# Patient Record
Sex: FEMALE | Race: BLACK OR AFRICAN AMERICAN | Employment: UNEMPLOYED | URBAN - METROPOLITAN AREA
[De-identification: names, ages, dates, MRNs, and addresses within clinical notes are randomized per-mention and may not be internally consistent; named-entity substitution may affect disease eponyms.]

---

## 2021-10-11 NOTE — PROGRESS NOTES
Chief complaint   Chief Complaint   Patient presents with    Back Pain       History of Present Illness:  Waldemar Krabbe is a  57 y.o.  female he comes in today for low back pain that radiates to her bilateral buttocks down to her feet. She also gets left groin pain. She describes it as sharp and stabbing especially with walking. She was a patient at [facility] for many years and has had epidural injections there every 6 months which did help. She states she has a known L4-L5 disc protrusion they were treating her for. She has been through physical therapy and does a home exercise program regularly. She states over the last month she has had increased pain in her back and can only stand 3 to 4 minutes at a time. She also has some right knee pain that simba on her at times. She states she was told she has had osteoarthritis in that knee before. She is unable to take any NSAIDs due to history of peptic ulcer disease. She has also recently had breast cancer and has had cervical cancer in 2013. She was a former . She is a non-smoker. She denies fever bowel bladder dysfunction. Her  is present with her in the office today. Past Medical History: Breast cancer August 2020 with chemotherapy and radiation, cervical cancer 2013, coronary disease, type 2 diabetes, hypertension, hyperlipidemia, MS, GERD, peptic ulcer disease    Past Surgical History: Left partial mastectomy, total hysterectomy      Physical Exam: Patient is a 70-year-old female who presents in a wheelchair today. She has 4 out of 5 strength bilateral lower extremities. Negative straight leg raise. She was able to get from sitting to standing on her own. She walks very antalgic Jagjit and slow with short steps. She has pain with hyperextension lumbar spine. Assessment and Plan: Is a patient who has back pain with bilateral radicular pain with a history of breast cancer and cervical cancer.   I did a lumbar AP and lateral x-ray. I will get a MRI of her lumbar spine. I will refer her to orthopedics for her right knee pain. She will follow-up with one of her physicians following the MRI. XRAY: 10/12/21   body part: Lumbar  side (rt/lt): bilateral  number of views taken:2  Findings: no acute finding    The x-ray will be officially read by Dr. Jules Larsen and scanned into the chart. Medications:        Review of systems:    Past Medical History:   Diagnosis Date    Acid reflux     Anemia     Arthritis     Asthma     Breast cancer ([facility] [address] current    left     Diabetes (HCC)     type 2    High cholesterol     Hypertension     Lumbar herniated disc     L4/L5, S1/S2    MI (mitral incompetence) 2002    MS (multiple sclerosis) (HCC)     Unexplained weight gain      Past Surgical History:   Procedure Laterality Date    HX HEART CATHETERIZATION  12/2020    NE MASTECTOMY, PARTIAL Left 12/07/2020     Social History     Socioeconomic History    Marital status: UNKNOWN     Spouse name: Not on file    Number of children: Not on file    Years of education: Not on file    Highest education level: Not on file   Occupational History    Not on file   Tobacco Use    Smoking status: Never Smoker    Smokeless tobacco: Never Used   Substance and Sexual Activity    Alcohol use: Not on file    Drug use: Not on file    Sexual activity: Not on file   Other Topics Concern    Not on file   Social History Narrative    Not on file     Social Determinants of Health     Financial Resource Strain:     Difficulty of Paying Living Expenses:    Food Insecurity:     Worried About Running Out of Food in the Last Year:     Amie of Food in the Last Year:    Transportation Needs:     Lack of Transportation (Medical):      Lack of Transportation (Non-Medical):    Physical Activity:     Days of Exercise per Week:     Minutes of Exercise per Session:    Stress:     Feeling of Stress :    Social Connections:     Frequency of Communication with Friends and Family:     Frequency of Social Gatherings with Friends and Family:     Attends Worship Services:     Active Member of Clubs or Organizations:     Attends Club or Organization Meetings:     Marital Status:    Intimate Partner Violence:     Fear of Current or Ex-Partner:     Emotionally Abused:     Physically Abused:     Sexually Abused:      No family history on file. Physical Exam:  Visit Vitals  Pulse 90   Temp 97.2 °F (36.2 °C) (Skin)   Ht 4' 11.75\" (1.518 m)   Wt 228 lb (103.4 kg)   SpO2 98% Comment: RA   BMI 44.90 kg/m²     Pain Scale: 7/10    LPMP has been . reviewed and is appropriate        Diagnoses and all orders for this visit:    1. Chronic bilateral low back pain with bilateral sciatica  -     AMB POC XRAY, SPINE, LUMBOSACRAL; 2 O  -     MRI LUMB SPINE WO CONT; Future    2. History of cancer  -     MRI LUMB SPINE WO CONT; Future    3. Chronic pain of right knee  -     REFERRAL TO ORTHOPEDICS            Follow-up and Dispositions    · Return in about 4 weeks (around 11/9/2021) for with Dr. Nader Tapia or Dr Madeline Littlejohn.              We have informed Derick Howell to notify us for immediate appointment if she has any worsening neurogical symptoms or if an emergency situation presents, then call [phone]

## 2021-10-12 ENCOUNTER — OFFICE VISIT (OUTPATIENT)
Dept: ORTHOPEDIC SURGERY | Age: 61
End: 2021-10-12
Payer: MEDICARE

## 2021-10-12 VITALS
OXYGEN SATURATION: 98 % | TEMPERATURE: 97.2 F | BODY MASS INDEX: 44.76 KG/M2 | WEIGHT: 228 LBS | HEART RATE: 90 BPM | HEIGHT: 60 IN

## 2021-10-12 DIAGNOSIS — M54.41 CHRONIC BILATERAL LOW BACK PAIN WITH BILATERAL SCIATICA: Primary | ICD-10-CM

## 2021-10-12 DIAGNOSIS — G89.29 CHRONIC PAIN OF RIGHT KNEE: ICD-10-CM

## 2021-10-12 DIAGNOSIS — G89.29 CHRONIC BILATERAL LOW BACK PAIN WITH BILATERAL SCIATICA: Primary | ICD-10-CM

## 2021-10-12 DIAGNOSIS — M25.561 CHRONIC PAIN OF RIGHT KNEE: ICD-10-CM

## 2021-10-12 DIAGNOSIS — M54.42 CHRONIC BILATERAL LOW BACK PAIN WITH BILATERAL SCIATICA: Primary | ICD-10-CM

## 2021-10-12 DIAGNOSIS — Z85.9 HISTORY OF CANCER: ICD-10-CM

## 2021-10-12 DIAGNOSIS — G89.29 CHRONIC BILATERAL LOW BACK PAIN WITH BILATERAL SCIATICA: ICD-10-CM

## 2021-10-12 DIAGNOSIS — M54.41 CHRONIC BILATERAL LOW BACK PAIN WITH BILATERAL SCIATICA: ICD-10-CM

## 2021-10-12 DIAGNOSIS — M54.42 CHRONIC BILATERAL LOW BACK PAIN WITH BILATERAL SCIATICA: ICD-10-CM

## 2021-10-12 PROCEDURE — G8427 DOCREV CUR MEDS BY ELIG CLIN: HCPCS | Performed by: NURSE PRACTITIONER

## 2021-10-12 PROCEDURE — 99203 OFFICE O/P NEW LOW 30 MIN: CPT | Performed by: NURSE PRACTITIONER

## 2021-10-12 PROCEDURE — 3017F COLORECTAL CA SCREEN DOC REV: CPT | Performed by: NURSE PRACTITIONER

## 2021-10-12 PROCEDURE — G8432 DEP SCR NOT DOC, RNG: HCPCS | Performed by: NURSE PRACTITIONER

## 2021-10-12 PROCEDURE — G8417 CALC BMI ABV UP PARAM F/U: HCPCS | Performed by: NURSE PRACTITIONER

## 2021-10-12 PROCEDURE — 72100 X-RAY EXAM L-S SPINE 2/3 VWS: CPT | Performed by: NURSE PRACTITIONER

## 2021-10-12 RX ORDER — DICLOFENAC SODIUM 10 MG/G
2 GEL TOPICAL 4 TIMES DAILY
COMMUNITY

## 2021-10-12 RX ORDER — ACETAMINOPHEN 325 MG/1
650 TABLET ORAL
COMMUNITY

## 2021-10-12 RX ORDER — LANOLIN ALCOHOL/MO/W.PET/CERES
3 CREAM (GRAM) TOPICAL
COMMUNITY

## 2021-10-12 RX ORDER — LACTULOSE 10 G/15ML
1 SOLUTION ORAL; RECTAL
COMMUNITY
End: 2022-02-01

## 2021-10-12 RX ORDER — BENAZEPRIL HYDROCHLORIDE 40 MG/1
40 TABLET ORAL DAILY
COMMUNITY
End: 2021-11-12 | Stop reason: SDUPTHER

## 2021-10-12 RX ORDER — CHLORTHALIDONE 25 MG/1
25 TABLET ORAL DAILY
COMMUNITY
End: 2021-11-12 | Stop reason: SDUPTHER

## 2021-10-12 RX ORDER — ONDANSETRON HYDROCHLORIDE 8 MG/1
8 TABLET, FILM COATED ORAL
COMMUNITY

## 2021-10-12 RX ORDER — MELATONIN
2000 DAILY
COMMUNITY

## 2021-10-12 RX ORDER — RANITIDINE 300 MG/1
300 TABLET ORAL DAILY
COMMUNITY
End: 2022-02-01

## 2021-10-12 RX ORDER — ALBUTEROL SULFATE 90 UG/1
1 AEROSOL, METERED RESPIRATORY (INHALATION)
COMMUNITY

## 2021-10-12 RX ORDER — DEXAMETHASONE 4 MG/1
4 TABLET ORAL EVERY 12 HOURS
COMMUNITY

## 2021-10-12 RX ORDER — POTASSIUM CHLORIDE 750 MG/1
10 TABLET, FILM COATED, EXTENDED RELEASE ORAL DAILY
COMMUNITY
End: 2022-05-26 | Stop reason: SDUPTHER

## 2021-10-12 RX ORDER — OMEPRAZOLE 20 MG/1
20 TABLET, DELAYED RELEASE ORAL DAILY
COMMUNITY

## 2021-10-12 RX ORDER — ANASTROZOLE 1 MG/1
1 TABLET ORAL DAILY
COMMUNITY
Start: 2021-10-04

## 2021-10-12 RX ORDER — ASPIRIN 81 MG/1
81 TABLET ORAL DAILY
COMMUNITY
End: 2021-11-12 | Stop reason: SDUPTHER

## 2021-10-12 RX ORDER — METOPROLOL SUCCINATE 200 MG/1
200 TABLET, EXTENDED RELEASE ORAL DAILY
COMMUNITY
End: 2021-11-12 | Stop reason: SDUPTHER

## 2021-10-12 RX ORDER — METFORMIN HYDROCHLORIDE 500 MG/1
500 TABLET ORAL 2 TIMES DAILY WITH MEALS
COMMUNITY

## 2021-10-12 RX ORDER — POLYETHYLENE GLYCOL 3350 17 G/17G
17 POWDER, FOR SOLUTION ORAL
COMMUNITY
End: 2022-02-01

## 2021-10-12 RX ORDER — TROSPIUM CHLORIDE 20 MG/1
20 TABLET, FILM COATED ORAL
COMMUNITY

## 2021-10-12 RX ORDER — MINERAL OIL/HYDROPHIL PETROLAT
OINTMENT (GRAM) TOPICAL AS NEEDED
COMMUNITY

## 2021-10-12 RX ORDER — AMLODIPINE BESYLATE 10 MG/1
10 TABLET ORAL DAILY
COMMUNITY
End: 2021-11-12 | Stop reason: SDUPTHER

## 2021-10-12 RX ORDER — SENNOSIDES 8.6 MG/1
1 TABLET ORAL
COMMUNITY
End: 2022-02-01

## 2021-10-12 RX ORDER — MOMETASONE FUROATE 220 UG/1
1 INHALANT RESPIRATORY (INHALATION) DAILY
COMMUNITY

## 2021-10-12 RX ORDER — ATORVASTATIN CALCIUM 40 MG
40 TABLET ORAL DAILY
COMMUNITY
End: 2021-11-12 | Stop reason: SDUPTHER

## 2021-10-12 RX ORDER — SPIRONOLACTONE 50 MG/1
50 TABLET, FILM COATED ORAL DAILY
COMMUNITY
End: 2021-11-12 | Stop reason: SDUPTHER

## 2021-10-12 RX ORDER — PREGABALIN 25 MG/1
100 CAPSULE ORAL 2 TIMES DAILY
COMMUNITY
End: 2022-02-01

## 2021-10-12 RX ORDER — INTERFERON BETA-1A 30 UG/.5ML
30 INJECTION, SOLUTION INTRAMUSCULAR
COMMUNITY

## 2021-10-12 RX ORDER — FOLIC ACID 1 MG/1
1 TABLET ORAL DAILY
COMMUNITY

## 2021-10-12 RX ORDER — PROCHLORPERAZINE MALEATE 5 MG
5 TABLET ORAL
COMMUNITY

## 2021-10-12 RX ORDER — MELOXICAM 7.5 MG/1
7.5 TABLET ORAL
COMMUNITY

## 2021-10-12 NOTE — PROGRESS NOTES
Juanita Marina presents today for   Chief Complaint   Patient presents with    Back Pain       Is someone accompanying this pt? Yes, male    Is the patient using any DME equipment during OV? Yes, wheelchair      Coordination of Care:  1. Have you been to the ER, urgent care clinic since your last visit? no  Hospitalized since your last visit? no    2. Have you seen or consulted any other health care providers outside of the [address] [place] [address] since your last visit? Yes, oncology/radiology Include any pap smears or colon screening.  no

## 2021-11-09 ENCOUNTER — OFFICE VISIT (OUTPATIENT)
Dept: ORTHOPEDIC SURGERY | Age: 61
End: 2021-11-09
Payer: MEDICARE

## 2021-11-09 VITALS — OXYGEN SATURATION: 98 % | HEIGHT: 60 IN | HEART RATE: 76 BPM | BODY MASS INDEX: 44.9 KG/M2 | TEMPERATURE: 97.3 F

## 2021-11-09 DIAGNOSIS — M25.561 CHRONIC PAIN OF RIGHT KNEE: ICD-10-CM

## 2021-11-09 DIAGNOSIS — M54.16 RIGHT LUMBAR RADICULOPATHY: Primary | ICD-10-CM

## 2021-11-09 DIAGNOSIS — G89.29 CHRONIC PAIN OF BOTH HIPS: ICD-10-CM

## 2021-11-09 DIAGNOSIS — G89.29 CHRONIC PAIN OF RIGHT KNEE: ICD-10-CM

## 2021-11-09 DIAGNOSIS — M25.552 CHRONIC PAIN OF BOTH HIPS: ICD-10-CM

## 2021-11-09 DIAGNOSIS — M25.551 CHRONIC PAIN OF BOTH HIPS: ICD-10-CM

## 2021-11-09 DIAGNOSIS — M54.16 RIGHT LUMBAR RADICULOPATHY: ICD-10-CM

## 2021-11-09 PROCEDURE — G8417 CALC BMI ABV UP PARAM F/U: HCPCS | Performed by: PHYSICAL MEDICINE & REHABILITATION

## 2021-11-09 PROCEDURE — G8432 DEP SCR NOT DOC, RNG: HCPCS | Performed by: PHYSICAL MEDICINE & REHABILITATION

## 2021-11-09 PROCEDURE — G8427 DOCREV CUR MEDS BY ELIG CLIN: HCPCS | Performed by: PHYSICAL MEDICINE & REHABILITATION

## 2021-11-09 PROCEDURE — 3017F COLORECTAL CA SCREEN DOC REV: CPT | Performed by: PHYSICAL MEDICINE & REHABILITATION

## 2021-11-09 PROCEDURE — 99214 OFFICE O/P EST MOD 30 MIN: CPT | Performed by: PHYSICAL MEDICINE & REHABILITATION

## 2021-11-09 NOTE — LETTER
11/9/2021    Patient: Trina Pate   YOB: 1960   Date of Visit: 11/9/2021     [facility],[place] [address], [address] [place] [address] Drive [address] [place] [phone]  Via Fax: [phone]    Dear [facility] [address], DO,      Thank you for referring Ms. Trina Pate to [facility] for evaluation. My notes for this consultation are attached. If you have questions, please do not hesitate to call me. I look forward to following your patient along with you.       Sincerely,    Daquan Schneider MD

## 2021-11-09 NOTE — PROGRESS NOTES
Ann Marie Doe presents today for   Chief Complaint   Patient presents with    Back Pain       Is someone accompanying this pt? Yes, male    Is the patient using any DME equipment during OV? Yes, wheelchair      Coordination of Care:  1. Have you been to the ER, urgent care clinic since your last visit? no  Hospitalized since your last visit? no    2. Have you seen or consulted any other health care providers outside of the [address] [place] [address] since your last visit? Yes, [facility]. Include any pap smears or colon screening.  no

## 2021-11-09 NOTE — PROGRESS NOTES
[address]  [address], [address]  [place], [address] [place] [address] Street  Phone: [phone]  Fax: [phone]      Patient: Brian Sams                                                                              MRN: [id number]        YOB: 1960          AGE: 57 y.o. PCP: Silas Fairbanks DO  Date:  11/09/21    Reason for Consultation: Back Pain      HPI:  Brian Sams is a 57 y.o. 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 She has previously lived in [place] and was followed by Dr. Karen Prieto at [address]. At the time she had mainly low back pain radiating down her left leg. MRI lumbar spine in 2017 demonstrates L4-5 and L5-S1 disc bulge, facet arthropathy, b/l mild foraminal stenosis at L5-S1. She tried PT and an L5-S1 IL UMESH (12/5/2018). She is taking lyrica 100mg, 100mg 200mg.   7/2020 she had covid and while undergoing evaluation she as found to have left breast cancer. Planning for PET scan 12/6/2021. Also reports chronic right knee pain related to arthritis, and b/l hip pain       Neurologic symptoms: No numbness, tingling, weakness, bowel or bladder changes. No recent falls in past 6 months, since her breast CA and radiation she has limited walking tolerance and is using a wheelchair. Location: The pain is located in the low back (left) , also left groin pain   Radiation: The pain does radiate down the left leg towards the ankle,   Pain Score: Currently: 10/10    Quality: Pain is of a Stabbing and Pulling quality. Aggravating: Pain is exacerbated by sitting, standing and lying down  Alleviating:  The pain is alleviated by nothing    Prior Treatments:   Dr. Lidia Burr -ESIs- last early 2020  Physical therapy- completed 8/2021  Previous Medications:   Current Medications:lyrica 100mg, 100mg, 200mg, meloxicam, on dexamethasone 4mg q12hr  Previous work-up has included:   Per records   MRI lumbar spine in 2017 demonstrates L4-5 and L5-S1 disc bulge, facet arthropathy, b/l mild foraminal stenosis at L5-S1. Past Medical History:   Past Medical History:   Diagnosis Date    Acid reflux     Anemia     Arthritis     Asthma     Breast cancer ([address] current    left     Diabetes (HCC)     type 2    High cholesterol     Hypertension     Lumbar herniated disc     L4/L5, S1/S2    MI (mitral incompetence) 2002    MS (multiple sclerosis) (HCC)     Unexplained weight gain       Past Surgical History:   Past Surgical History:   Procedure Laterality Date    HX HEART CATHETERIZATION  12/2020    RI MASTECTOMY, PARTIAL Left 12/07/2020      SocHx:   Social History     Tobacco Use    Smoking status: Never Smoker    Smokeless tobacco: Never Used   Substance Use Topics    Alcohol use: Not on file      FamHx:? No family history on file. Current Medications:    Current Outpatient Medications   Medication Sig Dispense Refill    anastrozole (ARIMIDEX) 1 mg tablet Take 1 mg by mouth daily.  albuterol (PROVENTIL HFA, VENTOLIN HFA, PROAIR HFA) 90 mcg/actuation inhaler Take 1 Puff by inhalation every six (6) hours as needed for Wheezing.  acetaminophen (TYLENOL) 325 mg tablet Take 650 mg by mouth every six (6) hours as needed for Pain.  amLODIPine (Norvasc) 10 mg tablet Take 10 mg by mouth daily.  mineral oil-hydrophil petrolat (Aquaphor) ointment Apply  to affected area as needed for Dry Skin.  aspirin delayed-release 81 mg tablet Take 81 mg by mouth daily.  atorvastatin (Lipitor) 40 mg tablet Take 40 mg by mouth daily.  beclomethasone (Qvar) 40 mcg/actuation aero Take 1 Puff by inhalation two (2) times a day.  benazepriL (LOTENSIN) 40 mg tablet Take 40 mg by mouth daily.       chlorthalidone (HYGROTON) 25 mg tablet Take 25 mg by mouth daily.      cholecalciferol (VITAMIN D3) (1000 Units /25 mcg) tablet Take 1,000 Units by mouth daily.  dexAMETHasone (Decadron) 4 mg tablet Take 4 mg by mouth every twelve (12) hours.  diclofenac (Voltaren) 1 % gel Apply 2 g to affected area four (4) times daily.  folic acid (FOLVITE) 1 mg tablet Take 1 mg by mouth daily.  interferon beta-1a (AVONEX) 30 mcg/0.5 mL injection 30 mcg by IntraMUSCular route once.  lactulose (CHRONULAC) 10 gram/15 mL solution Take 1 tsp by mouth daily as needed.  melatonin 3 mg tablet Take 3 mg by mouth nightly as needed for Insomnia.  meloxicam (MOBIC) 7.5 mg tablet Take 7.5 mg by mouth daily.  metFORMIN (GLUCOPHAGE) 500 mg tablet Take 500 mg by mouth two (2) times daily (with meals).  metoprolol succinate (TOPROL-XL) 200 mg XL tablet Take 200 mg by mouth daily.  mometasone (Asmanex Twisthaler) 220 mcg/ actuation (14) inhaler Take 1 Puff by inhalation daily.  omeprazole (PriLOSEC OTC) 20 mg tablet Take 20 mg by mouth daily.  ondansetron hcl (ZOFRAN) 8 mg tablet Take 8 mg by mouth every eight (8) hours as needed for Nausea or Vomiting.  polyethylene glycol (Miralax) 17 gram packet Take 17 g by mouth daily as needed for Constipation.  potassium chloride SR (Klor-Con 10) 10 mEq tablet Take 10 mEq by mouth daily.  pregabalin (Lyrica) 25 mg capsule Take 25 mg by mouth two (2) times a day.  prochlorperazine (Compazine) 5 mg tablet Take 5 mg by mouth every six (6) hours as needed for Nausea or Vomiting.  raNITIdine (ZANTAC) 300 mg tab Take 300 mg by mouth daily.  senna (Senna) 8.6 mg tablet Take 1 Tablet by mouth daily as needed for Constipation.  spironolactone (Aldactone) 50 mg tablet Take 50 mg by mouth daily.  trospium (SANCTURA) 20 mg tablet Take 20 mg by mouth Before breakfast, lunch, and dinner. Allergies:     Allergies   Allergen Reactions    Penicillin G Hives and Rash Review of Systems:   Gen:    Denied fevers, chills, malaise, fatigue, weight changes   Resp: Denied shortness of breath, cough, wheezing   CVS: Denied chest pain, palpitations   : Denied urinary urgency, frequency, incontinence   GI: Denied nausea, vomiting, constipation, diarrhea   Skin: Denied rashes, wounds   Psych: Denied anxiety, depression   Vasc: Denied claudication, ulcers   Hem: Denied easy bruising/bleeding   MSK: See HPI   Neuro: See HPI         Physical Exam     Vital Signs:   Visit Vitals  Pulse 76   Temp 97.3 °F (36.3 °C) (Skin)   Ht 4' 11.75\" (1.518 m)   SpO2 98% Comment: RA   BMI 44.90 kg/m²      General: ??????? Well nourished and well developed female without any acute distress   Psychiatric: ?  Alert and oriented x 3 with normal mood    HEENT: ???????? Atraumatic   Respiratory:   Breathing non-labored and non dyspneic   CV: ???????????????? Peripheral pulses intact, no peripheral edema   Skin: ????????????? No rashes       Neurologic: ?? Sensation: normal and grossly intact thebilateral, lower extremity(s)   Strength: 5/5 in the bilateral, lower extremity(s) b/l HF 4/5, rightKE limited by pain  Reflexes: reveals 2+ symmetric DTRs throughout patella  Gait: unsteady, antalgic decreased stance time right leg       Pain with lumbar flexion and extension  Tender throughout lumbar paraspinals  Tender L>R gluteal and SI joint    Tender right medial and lateral joint line  Pain with FADIR b/l  Negative log roll  + stinchfield b/l   Pain with HUI     Medical Decision Making:    Images: The imaging results as well as the actual images of the studies below were reviewed and visualized. Labs: The results below were reviewed. Records reviewed from [address] Dr. Barney Goodrich     Assessment:   -low back pain  -right knee oa  -b/l hip pain    Plan:      -Physical therapy -  Consider referral to PT after imaging  -Medications continue current medications-Lyrica,no refill required at this time.  Counseled regarding side effects and appropriate administration of medications.    -Diagnostics/Imaging -MRI lumbar spine, x-ray right knee and b/l hips  -Injections - consider UMESH- had relief with L4-5 [place] UMESH in 2018   -Education - The patient's diagnosis, prognosis and treatment options were discussed today.  All questions were answered.    -Coordination of Care- Reviewed prior records from [address]  F/U - in after MRI          [facility]

## 2021-11-12 ENCOUNTER — OFFICE VISIT (OUTPATIENT)
Dept: CARDIOLOGY CLINIC | Age: 61
End: 2021-11-12
Payer: MEDICARE

## 2021-11-12 VITALS
DIASTOLIC BLOOD PRESSURE: 74 MMHG | SYSTOLIC BLOOD PRESSURE: 110 MMHG | WEIGHT: 235 LBS | OXYGEN SATURATION: 97 % | HEIGHT: 60 IN | HEART RATE: 74 BPM | BODY MASS INDEX: 46.13 KG/M2

## 2021-11-12 DIAGNOSIS — I25.2 HISTORY OF MI (MYOCARDIAL INFARCTION): Primary | ICD-10-CM

## 2021-11-12 PROCEDURE — G8417 CALC BMI ABV UP PARAM F/U: HCPCS | Performed by: INTERNAL MEDICINE

## 2021-11-12 PROCEDURE — G8427 DOCREV CUR MEDS BY ELIG CLIN: HCPCS | Performed by: INTERNAL MEDICINE

## 2021-11-12 PROCEDURE — 93000 ELECTROCARDIOGRAM COMPLETE: CPT | Performed by: INTERNAL MEDICINE

## 2021-11-12 PROCEDURE — 3017F COLORECTAL CA SCREEN DOC REV: CPT | Performed by: INTERNAL MEDICINE

## 2021-11-12 PROCEDURE — G8510 SCR DEP NEG, NO PLAN REQD: HCPCS | Performed by: INTERNAL MEDICINE

## 2021-11-12 PROCEDURE — 99204 OFFICE O/P NEW MOD 45 MIN: CPT | Performed by: INTERNAL MEDICINE

## 2021-11-12 RX ORDER — SPIRONOLACTONE 50 MG/1
50 TABLET, FILM COATED ORAL DAILY
Qty: 90 TABLET | Refills: 3 | Status: SHIPPED | OUTPATIENT
Start: 2021-11-12 | End: 2022-05-26 | Stop reason: SDUPTHER

## 2021-11-12 RX ORDER — BENAZEPRIL HYDROCHLORIDE 40 MG/1
40 TABLET ORAL DAILY
Qty: 90 TABLET | Refills: 3 | Status: SHIPPED | OUTPATIENT
Start: 2021-11-12 | End: 2021-11-23 | Stop reason: SDUPTHER

## 2021-11-12 RX ORDER — ASPIRIN 81 MG/1
81 TABLET ORAL DAILY
Qty: 90 TABLET | Refills: 3 | Status: SHIPPED | OUTPATIENT
Start: 2021-11-12 | End: 2022-05-26 | Stop reason: SDUPTHER

## 2021-11-12 RX ORDER — AMLODIPINE BESYLATE 10 MG/1
10 TABLET ORAL DAILY
Qty: 90 TABLET | Refills: 3 | Status: SHIPPED | OUTPATIENT
Start: 2021-11-12 | End: 2022-05-26 | Stop reason: SDUPTHER

## 2021-11-12 RX ORDER — METOPROLOL SUCCINATE 200 MG/1
200 TABLET, EXTENDED RELEASE ORAL DAILY
Qty: 90 TABLET | Refills: 3 | Status: SHIPPED | OUTPATIENT
Start: 2021-11-12 | End: 2022-05-26 | Stop reason: SDUPTHER

## 2021-11-12 RX ORDER — ATORVASTATIN CALCIUM 40 MG/1
40 TABLET, FILM COATED ORAL DAILY
Qty: 90 TABLET | Refills: 3 | Status: SHIPPED | OUTPATIENT
Start: 2021-11-12 | End: 2022-05-26 | Stop reason: SDUPTHER

## 2021-11-12 RX ORDER — CHLORTHALIDONE 25 MG/1
25 TABLET ORAL DAILY
Qty: 90 TABLET | Refills: 3 | Status: SHIPPED | OUTPATIENT
Start: 2021-11-12 | End: 2022-05-26 | Stop reason: SDUPTHER

## 2021-11-12 NOTE — PROGRESS NOTES
Pranay Roberts    Chief Complaint   Patient presents with    New Patient     h/o MI     Shortness of Breath     exertional     Leg Swelling     mild    Palpitations     sometimes       HPI    Pranay Roberts is a 57 y.o. extremely pleasant patient with history of CAD and myocardial infarctions x2 in the past approximately in 2000 and 2009 but no cast who presented for a left heart cath in the setting of ongoing chest and shoulder pain back in September of 2020 (Jeff Rodrigues). I personally obtained and reviewed records his cath had no obstructive coronary disease there was commented mild diffuse disease in the RCA up to 25% in the midportion. She was diagnosed in August 2020 of breast cancer but it does not sound like she underwent mastectomy until after the above cath mention. I do not have all the details at the time of my encounter but she says she has completed chemotherapy still following with her oncologist down there and was able to see she had a normal ejection fraction back in 2019. She has gained weight and she has followed with a cardiologist she says since the early 2000. It is unclear what happened in the early 2000's but she had been maintained on dual antiplatelet therapy for years so finally after her most recent cath last year Plavix was stopped and she was started on high intensity statin. She has no new complaints she can feel short of breath has some nonpitting edema in her legs also can feel palpitations but really no new chest discomfort it sounds like she has a lot of back and shoulder pain which sounds chronic what led to her last cath.     Past Medical History:   Diagnosis Date    Acid reflux     Anemia     Arthritis     Asthma     Breast cancer ([address] current    left     Diabetes ([address]     type 2    High cholesterol     Hypertension     Lumbar herniated disc     L4/L5, S1/S2    MI (mitral incompetence) 2002    MS (multiple sclerosis) (HCC)     Unexplained weight gain        Past Surgical History:   Procedure Laterality Date    HX HEART CATHETERIZATION  12/2020    [place] MASTECTOMY, PARTIAL Left 12/07/2020       Current Outpatient Medications   Medication Sig Dispense Refill    anastrozole (ARIMIDEX) 1 mg tablet Take 1 mg by mouth daily.  albuterol (PROVENTIL HFA, VENTOLIN HFA, PROAIR HFA) 90 mcg/actuation inhaler Take 1 Puff by inhalation every six (6) hours as needed for Wheezing.  acetaminophen (TYLENOL) 325 mg tablet Take 650 mg by mouth every six (6) hours as needed for Pain.  amLODIPine (Norvasc) 10 mg tablet Take 10 mg by mouth daily.  mineral oil-hydrophil petrolat (Aquaphor) ointment Apply  to affected area as needed for Dry Skin.  aspirin delayed-release 81 mg tablet Take 81 mg by mouth daily.  atorvastatin (Lipitor) 40 mg tablet Take 40 mg by mouth daily.  beclomethasone (Qvar) 40 mcg/actuation aero Take 1 Puff by inhalation two (2) times a day.  benazepriL (LOTENSIN) 40 mg tablet Take 40 mg by mouth daily.  chlorthalidone (HYGROTON) 25 mg tablet Take 25 mg by mouth daily.  cholecalciferol (VITAMIN D3) (1000 Units /25 mcg) tablet Take 1,000 Units by mouth daily.  dexAMETHasone (Decadron) 4 mg tablet Take 4 mg by mouth every twelve (12) hours.  diclofenac (Voltaren) 1 % gel Apply 2 g to affected area four (4) times daily.  folic acid (FOLVITE) 1 mg tablet Take 1 mg by mouth daily.  interferon beta-1a (AVONEX) 30 mcg/0.5 mL injection 30 mcg by IntraMUSCular route once.  lactulose (CHRONULAC) 10 gram/15 mL solution Take 1 tsp by mouth daily as needed.  melatonin 3 mg tablet Take 3 mg by mouth nightly as needed for Insomnia.  meloxicam (MOBIC) 7.5 mg tablet Take 7.5 mg by mouth daily.  metFORMIN (GLUCOPHAGE) 500 mg tablet Take 500 mg by mouth two (2) times daily (with meals).  metoprolol succinate (TOPROL-XL) 200 mg XL tablet Take 200 mg by mouth daily.       mometasone (Asmanex Twisthaler) 220 mcg/ actuation (14) inhaler Take 1 Puff by inhalation daily.  omeprazole (PriLOSEC OTC) 20 mg tablet Take 20 mg by mouth daily.  ondansetron hcl (ZOFRAN) 8 mg tablet Take 8 mg by mouth every eight (8) hours as needed for Nausea or Vomiting.  polyethylene glycol (Miralax) 17 gram packet Take 17 g by mouth daily as needed for Constipation.  potassium chloride SR (Klor-Con 10) 10 mEq tablet Take 10 mEq by mouth daily.  pregabalin (Lyrica) 25 mg capsule Take 25 mg by mouth two (2) times a day.  prochlorperazine (Compazine) 5 mg tablet Take 5 mg by mouth every six (6) hours as needed for Nausea or Vomiting.  raNITIdine (ZANTAC) 300 mg tab Take 300 mg by mouth daily.  senna (Senna) 8.6 mg tablet Take 1 Tablet by mouth daily as needed for Constipation.  spironolactone (Aldactone) 50 mg tablet Take 50 mg by mouth daily.  trospium (SANCTURA) 20 mg tablet Take 20 mg by mouth Before breakfast, lunch, and dinner. Allergies   Allergen Reactions    Penicillin G Hives and Rash       Social History     Socioeconomic History    Marital status: UNKNOWN     Spouse name: Not on file    Number of children: Not on file    Years of education: Not on file    Highest education level: Not on file   Occupational History    Not on file   Tobacco Use    Smoking status: Never Smoker    Smokeless tobacco: Never Used   Substance and Sexual Activity    Alcohol use: Not on file    Drug use: Not on file    Sexual activity: Not on file   Other Topics Concern    Not on file   Social History Narrative    Not on file     Social Determinants of Health     Financial Resource Strain:     Difficulty of Paying Living Expenses: Not on file   Food Insecurity:     Worried About Running Out of Food in the Last Year: Not on file    Amie of Food in the Last Year: Not on file   Transportation Needs:     Lack of Transportation (Medical):  Not on file    Lack of Transportation (Non-Medical): Not on file   Physical Activity:     Days of Exercise per Week: Not on file    Minutes of Exercise per Session: Not on file   Stress:     Feeling of Stress : Not on file   Social Connections:     Frequency of Communication with Friends and Family: Not on file    Frequency of Social Gatherings with Friends and Family: Not on file    Attends Church Services: Not on file    Active Member of [address] [place] [id number] or Organizations: Not on file    Attends Club or Organization Meetings: Not on file    Marital Status: Not on file   Intimate Partner Violence:     Fear of Current or Ex-Partner: Not on file    Emotionally Abused: Not on file    Physically Abused: Not on file    Sexually Abused: Not on file   Housing Stability:     Unable to Pay for Housing in the Last Year: Not on file    Number of [place] in the Last Year: Not on file    Unstable Housing in the Last Year: Not on file    just moved to [place] where  is from    [facility]: Details are unknown at the time of the encounter but patient says her mother  of a heart attack at age [de-identified], she is got 5 brothers and 2 of them have \"heart issues\" and another brother who  of CVA at 48    Review of Systems    15 pt Review of Systems is negative unless otherwise mentioned in the HPI. Wt Readings from Last 3 Encounters:   21 106.6 kg (235 lb)   10/12/21 103.4 kg (228 lb)     Temp Readings from Last 3 Encounters:   21 97.3 °F (36.3 °C) (Skin)   10/12/21 97.2 °F (36.2 °C) (Skin)     BP Readings from Last 3 Encounters:   21 110/74     Pulse Readings from Last 3 Encounters:   21 74   21 76   10/12/21 90       Physical Exam:    Visit Vitals  /74 (BP 1 Location: Left upper arm, BP Patient Position: Sitting, BP Cuff Size: Adult)   Pulse 74   Ht 4' 11.75\" (1.518 m)   Wt 106.6 kg (235 lb)   SpO2 97%   BMI 46.28 kg/m²      Physical Exam  HENT:      Head: Normocephalic and atraumatic.    Eyes: Pupils: Pupils are equal, round, and reactive to light. Cardiovascular:      Rate and Rhythm: Normal rate and regular rhythm. Heart sounds: Normal heart sounds. No murmur heard. No friction rub. No gallop. Pulmonary:      Effort: Pulmonary effort is normal. No respiratory distress. Breath sounds: Normal breath sounds. No wheezing or rales. Chest:      Chest wall: No tenderness. Abdominal:      General: Bowel sounds are normal.      Palpations: Abdomen is soft. Musculoskeletal:         General: No tenderness. Skin:     General: Skin is warm and dry. Neurological:      Mental Status: She is alert and oriented to person, place, and time. EKG today shows: NSR, normal axis and intervals, no ST segment abnormalities    Impression and Plan:  Vivek Chavez is a 57 y.o. with:    Nonobstructive CAD, [facility] 9/2020  H/o \"myocardial infarction\" (nonathero MI x2 in early 2000s was on DAPT for years)  H/o CVA affecting her right side  MS  HTN  DM2  Breast CA s/p chemo/ radiation/ mastectomy    Obtaining records  Rx/ take over CV meds  RTC 6 months  Will consider repeat echo if nothing since 2019    Thank you for allowing me to participate in the care of your patient, please do not hesitate to call with questions or concerns.     [address] [facility],    Diogo Angelo, DO

## 2021-11-12 NOTE — PROGRESS NOTES
Funmilayo Peterson presents today for   Chief Complaint   Patient presents with    New Patient     h/o MI     Shortness of Breath     exertional     Leg Swelling     mild    Palpitations     sometimes       Paz Hernandez preferred language for health care discussion is english/other. Is someone accompanying this pt?      Is the patient using any DME equipment during [address]? Wheelchair     Depression Screening:  3 most recent PHQ Screens 11/12/2021   Little interest or pleasure in doing things Not at all   Feeling down, depressed, irritable, or hopeless Not at all   Total Score PHQ 2 0       Learning Assessment:  Learning Assessment 11/12/2021   PRIMARY LEARNER Patient   PRIMARY LANGUAGE ENGLISH   LEARNER PREFERENCE PRIMARY DEMONSTRATION   ANSWERED BY Patient   RELATIONSHIP SELF       Abuse Screening:  Abuse Screening Questionnaire 11/12/2021   Do you ever feel afraid of your partner? N   Are you in a relationship with someone who physically or mentally threatens you? N   Is it safe for you to go home? Y       Fall Risk  No flowsheet data found. Pt currently taking Anticoagulant therapy? ASA 81mg every day     Coordination of Care:  1. Have you been to the ER, urgent care clinic since your last visit? Hospitalized since your last visit? no    2. Have you seen or consulted any other health care providers outside of the [address] [place] [address] since your last visit? Include any pap smears or colon screening.  no

## 2021-11-23 RX ORDER — BENAZEPRIL HYDROCHLORIDE 40 MG/1
40 TABLET ORAL DAILY
Qty: 90 TABLET | Refills: 3 | Status: SHIPPED | OUTPATIENT
Start: 2021-11-23 | End: 2022-05-26 | Stop reason: SDUPTHER

## 2021-12-10 ENCOUNTER — OFFICE VISIT (OUTPATIENT)
Dept: ORTHOPEDIC SURGERY | Age: 61
End: 2021-12-10
Payer: MEDICARE

## 2021-12-10 VITALS — HEART RATE: 81 BPM | OXYGEN SATURATION: 97 % | TEMPERATURE: 98.7 F

## 2021-12-10 DIAGNOSIS — G89.29 CHRONIC PAIN OF RIGHT KNEE: Primary | ICD-10-CM

## 2021-12-10 DIAGNOSIS — M25.561 CHRONIC PAIN OF RIGHT KNEE: Primary | ICD-10-CM

## 2021-12-10 PROCEDURE — 73562 X-RAY EXAM OF KNEE 3: CPT | Performed by: ORTHOPAEDIC SURGERY

## 2021-12-10 PROCEDURE — 3017F COLORECTAL CA SCREEN DOC REV: CPT | Performed by: ORTHOPAEDIC SURGERY

## 2021-12-10 PROCEDURE — G8417 CALC BMI ABV UP PARAM F/U: HCPCS | Performed by: ORTHOPAEDIC SURGERY

## 2021-12-10 PROCEDURE — 99203 OFFICE O/P NEW LOW 30 MIN: CPT | Performed by: ORTHOPAEDIC SURGERY

## 2021-12-10 PROCEDURE — G8432 DEP SCR NOT DOC, RNG: HCPCS | Performed by: ORTHOPAEDIC SURGERY

## 2021-12-10 PROCEDURE — G8427 DOCREV CUR MEDS BY ELIG CLIN: HCPCS | Performed by: ORTHOPAEDIC SURGERY

## 2021-12-10 PROCEDURE — 20610 DRAIN/INJ JOINT/BURSA W/O US: CPT | Performed by: ORTHOPAEDIC SURGERY

## 2021-12-10 RX ORDER — TRIAMCINOLONE ACETONIDE 40 MG/ML
40 INJECTION, SUSPENSION INTRA-ARTICULAR; INTRAMUSCULAR ONCE
Status: COMPLETED | OUTPATIENT
Start: 2021-12-10 | End: 2021-12-10

## 2021-12-10 RX ADMIN — TRIAMCINOLONE ACETONIDE 40 MG: 40 INJECTION, SUSPENSION INTRA-ARTICULAR; INTRAMUSCULAR at 11:44

## 2021-12-10 NOTE — PROGRESS NOTES
Patient: Britany Larson                MRN: [id number]       SSN: [id number]  YOB: 1960        AGE: 57 y.o. SEX: female  There is no height or weight on file to calculate BMI. PCP: Santy Moeller DO  12/10/21    CHIEF COMPLAINT: Right knee pain     HPI: Britany Larson is a 57 y.o. female patient who presents to the office today with right knee pain. She is having right knee pain for quite some time. She says for the past 1 year she has been having buckling in the right knee. She has a history of breast cancer currently on oral chemotherapy. She also has a history of MS which does not help her ability to walk. She feels weak in the right knee and feels like it is going to give out. She is not had any specific treatment for this. Past Medical History:   Diagnosis Date    Acid reflux     Anemia     Arthritis     Asthma     Breast [facility] [address] current    left     Diabetes ([facility]     type 2    High cholesterol     Hypertension     Lumbar herniated disc     L4/L5, S1/S2    MI (mitral incompetence) 2002    MS (multiple sclerosis) ([facility]     Right knee pain     Unexplained weight gain        History reviewed. No pertinent family history. Current Outpatient Medications   Medication Sig Dispense Refill    benazepriL (LOTENSIN) 40 mg tablet Take 1 Tablet by mouth daily. 90 Tablet 3    amLODIPine (Norvasc) 10 mg tablet Take 1 Tablet by mouth daily. 90 Tablet 3    aspirin delayed-release 81 mg tablet Take 1 Tablet by mouth daily. 90 Tablet 3    atorvastatin (Lipitor) 40 mg tablet Take 1 Tablet by mouth daily. 90 Tablet 3    chlorthalidone (HYGROTON) 25 mg tablet Take 1 Tablet by mouth daily. 90 Tablet 3    spironolactone (Aldactone) 50 mg tablet Take 1 Tablet by mouth daily. 90 Tablet 3    metoprolol succinate (TOPROL-XL) 200 mg XL tablet Take 1 Tablet by mouth daily. 90 Tablet 3    anastrozole (ARIMIDEX) 1 mg tablet Take 1 mg by mouth daily.       albuterol (PROVENTIL HFA, VENTOLIN HFA, PROAIR HFA) 90 mcg/actuation inhaler Take 1 Puff by inhalation every six (6) hours as needed for Wheezing.  acetaminophen (TYLENOL) 325 mg tablet Take 650 mg by mouth every six (6) hours as needed for Pain.  mineral oil-hydrophil petrolat (Aquaphor) ointment Apply  to affected area as needed for Dry Skin.  beclomethasone (Qvar) 40 mcg/actuation aero Take 1 Puff by inhalation two (2) times a day.  cholecalciferol (VITAMIN D3) (1000 Units /25 mcg) tablet Take 1,000 Units by mouth daily.  dexAMETHasone (Decadron) 4 mg tablet Take 4 mg by mouth every twelve (12) hours.  diclofenac (Voltaren) 1 % gel Apply 2 g to affected area four (4) times daily.  folic acid (FOLVITE) 1 mg tablet Take 1 mg by mouth daily.  interferon beta-1a (AVONEX) 30 mcg/0.5 mL injection 30 mcg by IntraMUSCular route once.  lactulose (CHRONULAC) 10 gram/15 mL solution Take 1 tsp by mouth daily as needed.  melatonin 3 mg tablet Take 3 mg by mouth nightly as needed for Insomnia.  meloxicam (MOBIC) 7.5 mg tablet Take 7.5 mg by mouth daily.  metFORMIN (GLUCOPHAGE) 500 mg tablet Take 500 mg by mouth two (2) times daily (with meals).  mometasone (Asmanex Twisthaler) 220 mcg/ actuation (14) inhaler Take 1 Puff by inhalation daily.  omeprazole (PriLOSEC OTC) 20 mg tablet Take 20 mg by mouth daily.  ondansetron hcl (ZOFRAN) 8 mg tablet Take 8 mg by mouth every eight (8) hours as needed for Nausea or Vomiting.  polyethylene glycol (Miralax) 17 gram packet Take 17 g by mouth daily as needed for Constipation.  potassium chloride SR (Klor-Con 10) 10 mEq tablet Take 10 mEq by mouth daily.  pregabalin (Lyrica) 25 mg capsule Take 25 mg by mouth two (2) times a day.  prochlorperazine (Compazine) 5 mg tablet Take 5 mg by mouth every six (6) hours as needed for Nausea or Vomiting.  raNITIdine (ZANTAC) 300 mg tab Take 300 mg by mouth daily.  senna (Senna) 8.6 mg tablet Take 1 Tablet by mouth daily as needed for Constipation.  trospium (SANCTURA) 20 mg tablet Take 20 mg by mouth Before breakfast, lunch, and dinner. Allergies   Allergen Reactions    Penicillin G Hives and Rash       Past Surgical History:   Procedure Laterality Date    HX HEART CATHETERIZATION  12/2020    [place] MASTECTOMY, PARTIAL Left 12/07/2020       Social History     Socioeconomic History    Marital status: UNKNOWN     Spouse name: Not on file    Number of children: Not on file    Years of education: Not on file    Highest education level: Not on file   Occupational History    Not on file   Tobacco Use    Smoking status: Never Smoker    Smokeless tobacco: Never Used   Substance and Sexual Activity    Alcohol use: Not on file    Drug use: Not on file    Sexual activity: Not on file   Other Topics Concern    Not on file   Social History Narrative    Not on file     Social Determinants of Health     Financial Resource Strain:     Difficulty of Paying Living Expenses: Not on file   Food Insecurity:     Worried About Running Out of Food in the Last Year: Not on file    Amie of Food in the Last Year: Not on file   Transportation Needs:     Lack of Transportation (Medical): Not on file    Lack of Transportation (Non-Medical):  Not on file   Physical Activity:     Days of Exercise per Week: Not on file    Minutes of Exercise per Session: Not on file   Stress:     Feeling of Stress : Not on file   Social Connections:     Frequency of Communication with Friends and Family: Not on file    Frequency of Social Gatherings with Friends and Family: Not on file    Attends Congregational Services: Not on file    Active Member of Clubs or Organizations: Not on file    Attends Club or Organization Meetings: Not on file    Marital Status: Not on file   Intimate Partner Violence:     Fear of Current or Ex-Partner: Not on file    Emotionally Abused: Not on file   [facility] Physically Abused: Not on file    Sexually Abused: Not on file   Housing Stability:     Unable to Pay for Housing in the Last Year: Not on file    Number of Places Lived in the Last Year: Not on file    Unstable Housing in the Last Year: Not on file       REVIEW OF SYSTEMS:      CON: negative for recent weight loss/gain, fever, or chills  EYE: negative for double or blurry vision  ENT: negative for hoarseness  RS:   negative for cough, URI, SOB  CV:  negative for chest pain, palpitations  GI:    negative for blood in stool, nausea/vomiting  :  negative for blood in urine  MS: As per HPI  Other systems reviewed and noted below. PHYSICAL EXAMINATION:  Visit Vitals  Pulse 81   Temp 98.7 °F (37.1 °C)   SpO2 97%     There is no height or weight on file to calculate BMI. GENERAL: Alert and oriented x3, in no acute distress, well-developed, well-nourished. HEENT: Normocephalic, atraumatic. RESP: Non labored breathing with equal chest rise on inspiration. CV: Well perfused extremities. No cyanosis or clubbing noted. ABDOMEN: Soft, non-tender, non-distended. Knee Examination      R   L  Effusion   -   -  Warmth   -   -  Erythema   -   -  ROM   Extension  Full   Full   Flexion   Full   Full  Tenderness   Medial Joint  +   -   Lateral Joint  -   -   Posterior knee  -   -  Strength   Quad   5   5   Hamstring  5   5  Crepitus   Tibiofemoral   +   -   Patellofemoral  +   -  Instability   Anterior  -   -   Posterior  -   -   Patellofemoral  -   -  Lachman's   -   -  Anterior Drawer  -   -  Posterior Drawer  -   -  Jazzmine's   Pain   -   -   Locking  -   -  Calf TTP   -   -  Straight Leg Raise  -   -      IMAGING:  X-rays 3 views the right knee were taken the office today which show significant right medial knee osteoarthritis with complete joint space collapse and osteophyte formation as well as a varus deformity.     ASSESSMENT & PLAN  Diagnosis: Right knee symptomatic osteoarthritis    Paz has symptomatic right knee osteoarthritis. Her x-rays show complete medial joint space collapse with a varus deformity and osteophyte formation. I discussed several treatment options with her at length today. Unfortunately due to her morbid obesity as well as her history of MS and on chemotherapy for breast cancer treatment I do not think she is a good candidate for surgery at this time. Therefore we will try conservative management and she opted for a corticosteroid injection to the right knee. This was given without complication. She will follow-up as needed. [facility]  OFFICE PROCEDURE PROGRESS NOTE        Chart reviewed for the following:   Leona Gupta MD, have reviewed the History, Physical and updated the Allergic reactions for [address] performed immediately prior to start of procedure:   Leona Gupta MD, have performed the following reviews on Terrye Pill prior to the start of the procedure:            * Patient was identified by name and date of birth   * Agreement on procedure being performed was verified  * Risks and Benefits explained to the patient  * Procedure site verified and marked as necessary  * Patient was positioned for comfort  * Consent was signed and verified    Time: 11:49 AM    Location: Right knee joint intra-articular injection    Kenalog 40mg & 3cc Lidocaine    Date of procedure: 12/10/2021    Procedure performed by:  Mayra Sage MD    Provider assisted by: Antionette Quiles LPN    Patient assisted by: self    How tolerated by patient: tolerated the procedure well with no complications    Post Procedural Pain Scale: 0 - No Hurt    Comments: none        Electronically signed by: Mayra Sage MD    Note: This note was completed using voice recognition software.   Any typographical/name errors or mistakes are unintentional.

## 2021-12-13 ENCOUNTER — OFFICE VISIT (OUTPATIENT)
Dept: ORTHOPEDIC SURGERY | Age: 61
End: 2021-12-13
Payer: MEDICARE

## 2021-12-13 VITALS
HEART RATE: 63 BPM | HEIGHT: 59 IN | TEMPERATURE: 96.8 F | WEIGHT: 241 LBS | OXYGEN SATURATION: 95 % | BODY MASS INDEX: 48.59 KG/M2

## 2021-12-13 DIAGNOSIS — M54.16 RIGHT LUMBAR RADICULOPATHY: Primary | ICD-10-CM

## 2021-12-13 PROCEDURE — G8427 DOCREV CUR MEDS BY ELIG CLIN: HCPCS | Performed by: PHYSICAL MEDICINE & REHABILITATION

## 2021-12-13 PROCEDURE — 99213 OFFICE O/P EST LOW 20 MIN: CPT | Performed by: PHYSICAL MEDICINE & REHABILITATION

## 2021-12-13 PROCEDURE — G8432 DEP SCR NOT DOC, RNG: HCPCS | Performed by: PHYSICAL MEDICINE & REHABILITATION

## 2021-12-13 PROCEDURE — 3017F COLORECTAL CA SCREEN DOC REV: CPT | Performed by: PHYSICAL MEDICINE & REHABILITATION

## 2021-12-13 PROCEDURE — G8417 CALC BMI ABV UP PARAM F/U: HCPCS | Performed by: PHYSICAL MEDICINE & REHABILITATION

## 2021-12-13 RX ORDER — PROMETHAZINE HYDROCHLORIDE 25 MG/1
25 TABLET ORAL
COMMUNITY

## 2021-12-13 RX ORDER — DIAZEPAM 5 MG/1
5 TABLET ORAL AS NEEDED
COMMUNITY
Start: 2021-11-29

## 2021-12-13 RX ORDER — ESZOPICLONE 3 MG/1
3 TABLET, FILM COATED ORAL
COMMUNITY
Start: 2021-11-29

## 2021-12-13 RX ORDER — PREGABALIN 200 MG/1
200 CAPSULE ORAL
COMMUNITY

## 2021-12-13 RX ORDER — IBUPROFEN 800 MG/1
800 TABLET ORAL
COMMUNITY
Start: 2021-11-29

## 2021-12-13 NOTE — PROGRESS NOTES
Dylan Ma presents today for   Chief Complaint   Patient presents with    Follow-up     MRI       Is someone accompanying this pt? yes    Is the patient using any DME equipment during OV? Yes, wheel chair    Depression Screening:  3 most recent PHQ Screens 11/12/2021   Little interest or pleasure in doing things Not at all   Feeling down, depressed, irritable, or hopeless Not at all   Total Score PHQ 2 0       Learning Assessment:  Learning Assessment 11/12/2021   PRIMARY LEARNER Patient   PRIMARY LANGUAGE ENGLISH   LEARNER PREFERENCE PRIMARY DEMONSTRATION   ANSWERED BY Patient   RELATIONSHIP SELF       Abuse Screening:  Abuse Screening Questionnaire 11/12/2021   Do you ever feel afraid of your partner? N   Are you in a relationship with someone who physically or mentally threatens you? N   Is it safe for you to go home? Y       Fall Risk  No flowsheet data found. OPIOID RISK TOOL  No flowsheet data found. Coordination of Care:  1. Have you been to the ER, urgent care clinic since your last visit? no  Hospitalized since your last visit? no    2. Have you seen or consulted any other health care providers outside of the [address] [place] [address] since your last visit? Yes, 12/6-12/7 Oncologist, radiation Include any pap smears or colon screening.  no

## 2021-12-13 NOTE — PROGRESS NOTES
[address], [address]  [place], [address] [place] [address] Street  Phone: [phone]  Fax: [phone]      Patient: Beau Bernal                                                                              MRN: [id number]        YOB: 1960          AGE: 57 y.o. PCP: Liliana Slaughter,   Date:  12/13/21    Reason for Consultation: Follow-up (MRI)      HPI:  Beau Bernal is a 57 y.o. 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 She has previously lived in [place] and was followed by Dr. Jere Huffman at [facility]. At the time she had mainly low back pain radiating down her left leg. MRI lumbar spine in 2017 demonstrates L4-5 and L5-S1 disc bulge, facet arthropathy, b/l mild foraminal stenosis at L5-S1. She tried PT and an L5-S1 IL UMESH (12/5/2018). She is taking lyrica 100mg, 100mg 200mg.   7/2020 she had covid and while undergoing evaluation she as found to have left breast cancer. She is undergoing treatment at [facility] and has completed chemotherapy and radiation. Her most recent MRI lumbar spine with l5/S1 disc ulge and moderate R>L foraminal narrowing and contact on b/l S1 nerve roots. Also reports chronic right knee pain related to arthritis, and b/l hip pain       Neurologic symptoms: No numbness, tingling, weakness, bowel or bladder changes. No recent falls in past 6 months, since her breast CA and radiation she has limited walking tolerance and is using a wheelchair. Location: The pain is located in the low back (left) , also left groin pain /left buttock pain  Radiation: The pain does radiate down the right leg towards the ankle posteriorly   Pain Score: Currently: 7/10    Quality: Pain is of a Stabbing and Pulling quality.     Aggravating: Pain is exacerbated by sitting, standing and lying down  Alleviating: The pain is alleviated by nothing    Prior Treatments:   Dr. Savanna Carrillo -ESIs- last early 2020  Physical therapy- completed 8/2021  Previous Medications:   Current Medications:lyrica 100mg, 100mg, 200mg, meloxicam, on dexamethasone 4mg q12hr  Previous work-up has included:   Per records    MRI lumbar spine 4/22/2021  For the purposes of this dictation, the lowest well formed intervertebral disc space is assumed to be the L5-S1 level, and there are presumed to be five lumbar-type vertebral bodies. Straightening of normal lumbar lordosis. Vertebral body heights are maintained. Signal intensity throughout the bone marrow is within normal limits. No suspicious osseous lesion, acute fracture line, or bone marrow edema. The conus medullaris ends at a normal level. L1-L2: Circumferential disc bulge and tiny superimposed left subarticular protrusion without significant spinal canal stenosis or neural foraminal narrowing. L2-L3: Circumferential disc bulge and mild facet arthrosis. No significant spinal canal stenosis or neural foraminal narrowing. L3-L4: Diffuse disc bulge with mild ligamentum flavum thickening and facet arthrosis. No significant spinal canal stenosis or neural foraminal narrowing. L4-L5: Diffuse disc bulge with facet arthropathy bilaterally. No significant canal stenosis. Mild bilateral neural foraminal narrowing. L5-S1: Posterior disc bulge contacting the transiting S1 nerve roots bilaterally. Bilateral facet arthropathy. Moderate right greater than left neural foraminal narrowing.         Past Medical History:   Past Medical History:   Diagnosis Date    Acid reflux     Anemia     Arthritis     Asthma     Breast cancer ([facility] [address] current    left     Diabetes ([facility] [address]     type 2    High cholesterol     Hypertension     Lumbar herniated disc     L4/L5, S1/S2    MI (mitral incompetence) 2002    [facility]     Right knee pain  Unexplained weight gain       Past Surgical History:   Past Surgical History:   Procedure Laterality Date    HX HEART CATHETERIZATION  12/2020    NE MASTECTOMY, PARTIAL Left 12/07/2020      SocHx:   Social History     Tobacco Use    Smoking status: Never Smoker    Smokeless tobacco: Never Used   Substance Use Topics    Alcohol use: Not on file      FamHx:? No family history on file. Current Medications:    Current Outpatient Medications   Medication Sig Dispense Refill    diazePAM (VALIUM) 5 mg tablet       eszopiclone (LUNESTA) 3 mg tablet       ibuprofen (MOTRIN) 800 mg tablet       pantothenic ac-min oil-pet,hyd (Aquaphor Healing) 41 % ointment Apply  to affected area.  promethazine (PHENERGAN) 25 mg tablet Take 25 mg by mouth every six (6) hours as needed.  pregabalin (Lyrica) 200 mg capsule Take 200 mg by mouth.  benazepriL (LOTENSIN) 40 mg tablet Take 1 Tablet by mouth daily. 90 Tablet 3    amLODIPine (Norvasc) 10 mg tablet Take 1 Tablet by mouth daily. 90 Tablet 3    aspirin delayed-release 81 mg tablet Take 1 Tablet by mouth daily. 90 Tablet 3    atorvastatin (Lipitor) 40 mg tablet Take 1 Tablet by mouth daily. 90 Tablet 3    chlorthalidone (HYGROTON) 25 mg tablet Take 1 Tablet by mouth daily. 90 Tablet 3    spironolactone (Aldactone) 50 mg tablet Take 1 Tablet by mouth daily. 90 Tablet 3    metoprolol succinate (TOPROL-XL) 200 mg XL tablet Take 1 Tablet by mouth daily. 90 Tablet 3    anastrozole (ARIMIDEX) 1 mg tablet Take 1 mg by mouth daily.  albuterol (PROVENTIL HFA, VENTOLIN HFA, PROAIR HFA) 90 mcg/actuation inhaler Take 1 Puff by inhalation every six (6) hours as needed for Wheezing.  acetaminophen (TYLENOL) 325 mg tablet Take 650 mg by mouth every six (6) hours as needed for Pain.  mineral oil-hydrophil petrolat (Aquaphor) ointment Apply  to affected area as needed for Dry Skin.       beclomethasone (Qvar) 40 mcg/actuation aero Take 1 Puff by inhalation two (2) times a day.  cholecalciferol (VITAMIN D3) (1000 Units /25 mcg) tablet Take 1,000 Units by mouth daily.  dexAMETHasone (Decadron) 4 mg tablet Take 4 mg by mouth every twelve (12) hours.  diclofenac (Voltaren) 1 % gel Apply 2 g to affected area four (4) times daily.  folic acid (FOLVITE) 1 mg tablet Take 1 mg by mouth daily.  interferon beta-1a (AVONEX) 30 mcg/0.5 mL injection 30 mcg by IntraMUSCular route once.  melatonin 3 mg tablet Take 3 mg by mouth nightly as needed for Insomnia.  meloxicam (MOBIC) 7.5 mg tablet Take 7.5 mg by mouth daily.  metFORMIN (GLUCOPHAGE) 500 mg tablet Take 500 mg by mouth two (2) times daily (with meals).  mometasone (Asmanex Twisthaler) 220 mcg/ actuation (14) inhaler Take 1 Puff by inhalation daily.  omeprazole (PriLOSEC OTC) 20 mg tablet Take 20 mg by mouth daily.  ondansetron hcl (ZOFRAN) 8 mg tablet Take 8 mg by mouth every eight (8) hours as needed for Nausea or Vomiting.  potassium chloride SR (Klor-Con 10) 10 mEq tablet Take 10 mEq by mouth daily.  pregabalin (Lyrica) 25 mg capsule Take 100 mg by mouth two (2) times a day.  prochlorperazine (Compazine) 5 mg tablet Take 5 mg by mouth every six (6) hours as needed for Nausea or Vomiting.  raNITIdine (ZANTAC) 300 mg tab Take 300 mg by mouth daily.  senna (Senna) 8.6 mg tablet Take 1 Tablet by mouth daily as needed for Constipation.  trospium (SANCTURA) 20 mg tablet Take 20 mg by mouth Before breakfast, lunch, and dinner.  lactulose (CHRONULAC) 10 gram/15 mL solution Take 1 tsp by mouth daily as needed. (Patient not taking: Reported on 12/13/2021)      polyethylene glycol (Miralax) 17 gram packet Take 17 g by mouth daily as needed for Constipation. (Patient not taking: Reported on 12/13/2021)        Allergies:     Allergies   Allergen Reactions    Penicillin G Hives and Rash        Review of Systems:   Gen:    Denied fevers, chills, malaise, fatigue, weight changes   Resp: Denied shortness of breath, cough, wheezing   CVS: Denied chest pain, palpitations   : Denied urinary urgency, frequency, incontinence   GI: Denied nausea, vomiting, constipation, diarrhea   Skin: Denied rashes, wounds   Psych: Denied anxiety, depression   Vasc: Denied claudication, ulcers   Hem: Denied easy bruising/bleeding   MSK: See HPI   Neuro: See HPI         Physical Exam     Vital Signs:   Visit Vitals  Pulse 63   Temp 96.8 °F (36 °C) (Tympanic)   Ht 4' 11\" (1.499 m)   Wt 241 lb (109.3 kg)   SpO2 95%   BMI 48.68 kg/m²      General: ??????? Well nourished and well developed female without any acute distress   Psychiatric: ?  Alert and oriented x 3 with normal mood    HEENT: ???????? Atraumatic   Respiratory:   Breathing non-labored and non dyspneic   CV: ???????????????? Peripheral pulses intact, no peripheral edema   Skin: ????????????? No rashes       Neurologic: ?? Sensation: normal and grossly intact thebilateral, lower extremity(s)   Strength: 5/5 in the bilateral, lower extremity(s) b/l HF 4/5, rightKE limited by pain  Reflexes: reveals 2+ symmetric DTRs throughout patella  Gait: unsteady, antalgic decreased stance time right leg       Pain with lumbar flexion and extension  Tender throughout lumbar paraspinals  Tender L>R gluteal and SI joint  + right seated slump test  Pain with FADIR b/l  Negative log roll  + stinchfield b/l   Pain with HUI       Medical Decision Making:    Images: The imaging results as well as the actual images of the studies below were reviewed and visualized. Labs: The results below were reviewed.    Records reviewed from [facility] Dr. Laura Roque   MRI lumbar spine 4/2021 reviewed  MRI brain reviewed  Bone density -- osteopenia     Assessment:   -low back pain-right lumbar radiculopathy  -right knee oa  -b/l hip pain    Plan:      -Physical therapy -  Referral to PT to start aquatic therapy  -Medications continue current medications-Lyrica 100, 100, 200mg Counseled regarding side effects and appropriate administration of medications.    -Diagnostics/Imaging -MRI lumbar spine, x-ray right knee and b/l hips  -Injections -referral for right S1 SNRB, if no relief consider L4/5 Il UMESH which has helped in the past   -Education - The patient's diagnosis, prognosis and treatment options were discussed today.  All questions were answered.    -Coordination of Care- Reviewed prior records from [facility]  F/U -after [address] [facility]

## 2022-01-18 ENCOUNTER — HOSPITAL ENCOUNTER (OUTPATIENT)
Age: 62
Setting detail: OUTPATIENT SURGERY
Discharge: HOME | End: 2022-01-18
Attending: PHYSICAL MEDICINE & REHABILITATION | Admitting: PHYSICAL MEDICINE & REHABILITATION
Payer: MEDICARE

## 2022-01-18 ENCOUNTER — APPOINTMENT (OUTPATIENT)
Dept: GENERAL RADIOLOGY | Age: 62
End: 2022-01-18
Attending: PHYSICAL MEDICINE & REHABILITATION
Payer: MEDICARE

## 2022-01-18 VITALS
HEART RATE: 77 BPM | RESPIRATION RATE: 14 BRPM | DIASTOLIC BLOOD PRESSURE: 105 MMHG | TEMPERATURE: 97.9 F | OXYGEN SATURATION: 94 % | SYSTOLIC BLOOD PRESSURE: 178 MMHG

## 2022-01-18 LAB — GLUCOSE BLD STRIP.AUTO-MCNC: 116 MG/DL (ref 70–110)

## 2022-01-18 PROCEDURE — 76010000009 HC PAIN MGT 0 TO 30 MIN PROC: Performed by: PHYSICAL MEDICINE & REHABILITATION

## 2022-01-18 PROCEDURE — 77030039433 HC TY MYLEOGRAM BD -B: Performed by: PHYSICAL MEDICINE & REHABILITATION

## 2022-01-18 PROCEDURE — 74011000636 HC RX REV CODE- 636: Performed by: PHYSICAL MEDICINE & REHABILITATION

## 2022-01-18 PROCEDURE — 82962 GLUCOSE BLOOD TEST: CPT

## 2022-01-18 PROCEDURE — 2709999900 HC NON-CHARGEABLE SUPPLY: Performed by: PHYSICAL MEDICINE & REHABILITATION

## 2022-01-18 PROCEDURE — 74011250637 HC RX REV CODE- 250/637: Performed by: PHYSICAL MEDICINE & REHABILITATION

## 2022-01-18 PROCEDURE — 74011250636 HC RX REV CODE- 250/636: Performed by: PHYSICAL MEDICINE & REHABILITATION

## 2022-01-18 PROCEDURE — 64483 NJX AA&/STRD TFRM EPI L/S 1: CPT | Performed by: PHYSICAL MEDICINE & REHABILITATION

## 2022-01-18 PROCEDURE — 77030003669 HC NDL SPN COOK -B: Performed by: PHYSICAL MEDICINE & REHABILITATION

## 2022-01-18 PROCEDURE — 74011000250 HC RX REV CODE- 250: Performed by: PHYSICAL MEDICINE & REHABILITATION

## 2022-01-18 RX ORDER — DIAZEPAM 5 MG/1
5-20 TABLET ORAL ONCE
Status: COMPLETED | OUTPATIENT
Start: 2022-01-18 | End: 2022-01-18

## 2022-01-18 RX ORDER — DEXAMETHASONE SODIUM PHOSPHATE 100 MG/10ML
INJECTION INTRAMUSCULAR; INTRAVENOUS AS NEEDED
Status: DISCONTINUED | OUTPATIENT
Start: 2022-01-18 | End: 2022-01-18 | Stop reason: HOSPADM

## 2022-01-18 RX ORDER — LIDOCAINE HYDROCHLORIDE 10 MG/ML
INJECTION, SOLUTION EPIDURAL; INFILTRATION; INTRACAUDAL; PERINEURAL AS NEEDED
Status: DISCONTINUED | OUTPATIENT
Start: 2022-01-18 | End: 2022-01-18 | Stop reason: HOSPADM

## 2022-01-18 RX ADMIN — DIAZEPAM 10 MG: 5 TABLET ORAL at 11:05

## 2022-01-18 NOTE — INTERVAL H&P NOTE
Update History & Physical    The Patient's History and Physical of December 13, 2021 was reviewed. There was no change. The surgical site was confirmed by the patient and me. Plan:  The risk, benefits, expected outcome, and alternative to the recommended procedure have been discussed with the patient. Patient understands and wants to proceed with the procedure.     Electronically signed by Aundrea Brasher MD on 1/18/2022 at 12:41 PM

## 2022-01-18 NOTE — H&P
Office Visit    12/13/2021  [facility]   Patito Zepeda MD      Physical Medicine and Rehabilitation  Right lumbar radiculopathy      Dx  Follow-up ; Referred by Angel Boyd DO      Reason for Visit       Progress Notes  Patito Zepeda MD (Physician) [facility]          [facility]  [address], [address]  [facility], [address]  Phone: [phone]  Fax: [phone]        Patient: Germaine Ramos                                                                                                                                   MRN: [id number]        YOB: 1960          AGE: 57 y.o.              PCP: Angel Boyd DO  Date:  12/13/21     Reason for Consultation: Follow-up (MRI)        HPI:  Germaine Ramos is a 57 y.o. 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 She has previously lived in [place] and was followed by Dr. Eloy Presley at [facility]. At the time she had mainly low back pain radiating down her left leg. MRI lumbar spine in 2017 demonstrates L4-5 and L5-S1 disc bulge, facet arthropathy, b/l mild foraminal stenosis at L5-S1. She tried PT and an L5-S1 IL UMESH (12/5/2018). She is taking lyrica 100mg, 100mg 200mg.   7/2020 she had covid and while undergoing evaluation she as found to have left breast cancer. She is undergoing treatment at [facility] and has completed chemotherapy and radiation. Her most recent MRI lumbar spine with l5/S1 disc ulge and moderate R>L foraminal narrowing and contact on b/l S1 nerve roots.             Also reports chronic right knee pain related to arthritis, and b/l hip pain         Neurologic symptoms: No numbness, tingling, weakness, bowel or bladder changes.   No recent falls in past 6 months, since her breast CA and radiation she has limited walking tolerance and is using a wheelchair.      Location: The pain is located in the low back (left) , also left groin pain /left buttock pain  Radiation: The pain does radiate down the right leg towards the ankle posteriorly   Pain Score: Currently: 7/10    Quality: Pain is of a Stabbing and Pulling quality. Aggravating: Pain is exacerbated by sitting, standing and lying down  Alleviating: The pain is alleviated by nothing     Prior Treatments:   Dr. Tari Douglas -ESIs- last early 2020  Physical therapy- completed 8/2021  Previous Medications:   Current Medications:lyrica 100mg, 100mg, 200mg, meloxicam, on dexamethasone 4mg q12hr  Previous work-up has included:   Per records     MRI lumbar spine 4/22/2021  For the purposes of this dictation, the lowest well formed intervertebral disc space is assumed to be the L5-S1 level, and there are presumed to be five lumbar-type vertebral bodies. Straightening of normal lumbar lordosis. Vertebral body heights are maintained. Signal intensity throughout the bone marrow is within normal limits. No suspicious osseous lesion, acute fracture line, or bone marrow edema. The conus medullaris ends at a normal level. L1-L2: Circumferential disc bulge and tiny superimposed left subarticular protrusion without significant spinal canal stenosis or neural foraminal narrowing. L2-L3: Circumferential disc bulge and mild facet arthrosis. No significant spinal canal stenosis or neural foraminal narrowing. L3-L4: Diffuse disc bulge with mild ligamentum flavum thickening and facet arthrosis. No significant spinal canal stenosis or neural foraminal narrowing. L4-L5: Diffuse disc bulge with facet arthropathy bilaterally. No significant canal stenosis. Mild bilateral neural foraminal narrowing. L5-S1: Posterior disc bulge contacting the transiting S1 nerve roots bilaterally.  Bilateral facet arthropathy. Moderate right greater than left neural foraminal narrowing.          Past Medical History:        Past Medical History:   Diagnosis Date    Acid reflux      Anemia      Arthritis      Asthma      Breast cancer (HCC) current     left     Diabetes (HCC)       type 2    High cholesterol      Hypertension      Lumbar herniated disc       L4/L5, S1/S2    MI (mitral incompetence) 2002    MS (multiple sclerosis) (HCC)      Right knee pain      Unexplained weight gain        Past Surgical History:         Past Surgical History:   Procedure Laterality Date    HX HEART CATHETERIZATION   12/2020    MO MASTECTOMY, PARTIAL Left 12/07/2020      SocHx:   Social History           Tobacco Use    Smoking status: Never Smoker    Smokeless tobacco: Never Used   Substance Use Topics    Alcohol use: Not on file      FamHx:? No family history on file. Current Medications:    Current Outpatient Medications   Medication Sig Dispense Refill    diazePAM (VALIUM) 5 mg tablet          eszopiclone (LUNESTA) 3 mg tablet          ibuprofen (MOTRIN) 800 mg tablet          pantothenic ac-min oil-pet,hyd (Aquaphor Healing) 41 % ointment Apply  to affected area.        promethazine (PHENERGAN) 25 mg tablet Take 25 mg by mouth every six (6) hours as needed.        pregabalin (Lyrica) 200 mg capsule Take 200 mg by mouth.        benazepriL (LOTENSIN) 40 mg tablet Take 1 Tablet by mouth daily. 90 Tablet 3    amLODIPine (Norvasc) 10 mg tablet Take 1 Tablet by mouth daily. 90 Tablet 3    aspirin delayed-release 81 mg tablet Take 1 Tablet by mouth daily. 90 Tablet 3    atorvastatin (Lipitor) 40 mg tablet Take 1 Tablet by mouth daily. 90 Tablet 3    chlorthalidone (HYGROTON) 25 mg tablet Take 1 Tablet by mouth daily. 90 Tablet 3    spironolactone (Aldactone) 50 mg tablet Take 1 Tablet by mouth daily. 90 Tablet 3    metoprolol succinate (TOPROL-XL) 200 mg XL tablet Take 1 Tablet by mouth daily.  90 Tablet 3    anastrozole (ARIMIDEX) 1 mg tablet Take 1 mg by mouth daily.        albuterol (PROVENTIL HFA, VENTOLIN HFA, PROAIR HFA) 90 mcg/actuation inhaler Take 1 Puff by inhalation every six (6) hours as needed for Wheezing.        acetaminophen (TYLENOL) 325 mg tablet Take 650 mg by mouth every six (6) hours as needed for Pain.        mineral oil-hydrophil petrolat (Aquaphor) ointment Apply  to affected area as needed for Dry Skin.        beclomethasone (Qvar) 40 mcg/actuation aero Take 1 Puff by inhalation two (2) times a day.        cholecalciferol (VITAMIN D3) (1000 Units /25 mcg) tablet Take 1,000 Units by mouth daily.        dexAMETHasone (Decadron) 4 mg tablet Take 4 mg by mouth every twelve (12) hours.        diclofenac (Voltaren) 1 % gel Apply 2 g to affected area four (4) times daily.        folic acid (FOLVITE) 1 mg tablet Take 1 mg by mouth daily.        interferon beta-1a (AVONEX) 30 mcg/0.5 mL injection 30 mcg by IntraMUSCular route once.        melatonin 3 mg tablet Take 3 mg by mouth nightly as needed for Insomnia.        meloxicam (MOBIC) 7.5 mg tablet Take 7.5 mg by mouth daily.        metFORMIN (GLUCOPHAGE) 500 mg tablet Take 500 mg by mouth two (2) times daily (with meals).         mometasone (Asmanex Twisthaler) 220 mcg/ actuation (14) inhaler Take 1 Puff by inhalation daily.        omeprazole (PriLOSEC OTC) 20 mg tablet Take 20 mg by mouth daily.        ondansetron hcl (ZOFRAN) 8 mg tablet Take 8 mg by mouth every eight (8) hours as needed for Nausea or Vomiting.        potassium chloride SR (Klor-Con 10) 10 mEq tablet Take 10 mEq by mouth daily.        pregabalin (Lyrica) 25 mg capsule Take 100 mg by mouth two (2) times a day.        prochlorperazine (Compazine) 5 mg tablet Take 5 mg by mouth every six (6) hours as needed for Nausea or Vomiting.        raNITIdine (ZANTAC) 300 mg tab Take 300 mg by mouth daily.        senna (Senna) 8.6 mg tablet Take 1 Tablet by mouth daily as needed for Constipation.        trospium (SANCTURA) 20 mg tablet Take 20 mg by mouth Before breakfast, lunch, and dinner.        lactulose (CHRONULAC) 10 gram/15 mL solution Take 1 tsp by mouth daily as needed. (Patient not taking: Reported on 12/13/2021)        polyethylene glycol (Miralax) 17 gram packet Take 17 g by mouth daily as needed for Constipation. (Patient not taking: Reported on 12/13/2021)          Allergies: Allergies   Allergen Reactions    Penicillin G Hives and Rash         Review of Systems:   Gen:    Denied fevers, chills, malaise, fatigue, weight changes   Resp:   Denied shortness of breath, cough, wheezing   CVS:    Denied chest pain, palpitations   :      Denied urinary urgency, frequency, incontinence   GI:       Denied nausea, vomiting, constipation, diarrhea   Skin:    Denied rashes, wounds   Psych: Denied anxiety, depression   Vasc:   Denied claudication, ulcers   Hem:    Denied easy bruising/bleeding   MSK:   See HPI   Neuro: See HPI          Physical Exam      Vital Signs:   Visit Vitals  Pulse 63   Temp 96.8 °F (36 °C) (Tympanic)   Ht 4' 11\" (1.499 m)   Wt 241 lb (109.3 kg)   SpO2 95%   BMI 48.68 kg/m²      General: ??????? Well nourished and well developed female without any acute distress   Psychiatric: ?  Alert and oriented x 3 with normal mood    HEENT: ???????? Atraumatic   Respiratory:   Breathing non-labored and non dyspneic   CV: ???????????????? Peripheral pulses intact, no peripheral edema   Skin: ?????????????   No rashes         Neurologic: ??       Sensation: normal and grossly intact thebilateral, lower extremity(s)   Strength: 5/5 in the bilateral, lower extremity(s) b/l HF 4/5, rightKE limited by pain  Reflexes: reveals 2+ symmetric DTRs throughout patella  Gait: unsteady, antalgic decreased stance time right leg        Pain with lumbar flexion and extension  Tender throughout lumbar paraspinals  Tender L>R gluteal and SI joint  + right seated slump test  Pain with CLAUDIAIR b/l  Negative log roll  [facility]   Pain with HUI         Medical Decision Making:    Images: The imaging results as well as the actual images of the studies below were reviewed and visualized. Labs: The results below were reviewed. Records reviewed from [facility] Dr. Gilbert Aranda   MRI lumbar spine 4/2021 reviewed  MRI brain reviewed  Bone density -- osteopenia      Assessment:   -low back pain-right lumbar radiculopathy  -right knee oa  -b/l hip pain     Plan:       -Physical therapy -  Referral to PT to start aquatic therapy  -Medications continue current medications-Lyrica 100, 100, 200mg Counseled regarding side effects and appropriate administration of medications.    -Diagnostics/Imaging -MRI lumbar spine, x-ray right knee and b/l hips  -Injections -referral for right S1 SNRB, if no relief consider L4/5 Il UMESH which has helped in the past   -Education - The patient's diagnosis, prognosis and treatment options were discussed today. All questions were answered.    -Coordination of Care- Reviewed prior records from [facility]  F/U -after UMESH            [facility]           Note Details     Other Notes         SCHEDULE SURGERY Procedure note         Progress Notes from Joselyn Short        Level of Service Calculator Selections    Number and Complexity of Problems Addressed                    1 or more chronic illness with exacerbation, progression, or side effects of treatment            Amount and/or Complexity of Data to be Reviewed and Analyzed                    2 review of the results of each unique test                Please see the note for further information on patient assessment and treatment.               Instructions     To Take With You (Automatic SnapShot taken 12/13/2021)        Additional Documentation    Vitals:  Pulse 63     Temp 96.8 °F (36 °C) (Tympanic)     Ht 4' 11\" (1.499 m)     Wt 241 lb (109.3 kg)     SpO2 95%     BMI 48.68 kg/m²     BSA 2.13 m²     Pain Sc   7 (Loc: Leg)             More Vitals     Flowsheets:  Fluid Management,     Pain Assessment        Encounter Info:  Billing Info,     History,     Allergies,     Detailed Report          Media  From this encounter  Scan on 12/23/2021 1126 by Fernando Delatorre S: Internal Documents/CLAUDINE Mast One/Intake Sheet/12-13-21     BestPractice Advisories    Click to view BestPractice Advisory history     Encounter Messages    No messages in this encounter       Orders Placed     REFERRAL TO PHYSICAL THERAPY Closed   SCHEDULE SURGERY      Outpatient Medications at End of Encounter as of 12/13/2021    diazePAM (VALIUM) 5 mg tablet (Taking)    eszopiclone (LUNESTA) 3 mg tablet (Taking)    ibuprofen (MOTRIN) 800 mg tablet (Taking)    pantothenic ac-min oil-pet,hyd (Aquaphor Healing) 41 % ointment (Taking) Apply  to affected area. promethazine (PHENERGAN) 25 mg tablet (Taking) Take 25 mg by mouth every six (6) hours as needed. pregabalin (Lyrica) 200 mg capsule (Taking) Take 200 mg by mouth.   benazepriL (LOTENSIN) 40 mg tablet (Taking) Take 1 Tablet by mouth daily. amLODIPine (Norvasc) 10 mg tablet (Taking) Take 1 Tablet by mouth daily. aspirin delayed-release 81 mg tablet (Taking) Take 1 Tablet by mouth daily. atorvastatin (Lipitor) 40 mg tablet (Taking) Take 1 Tablet by mouth daily. chlorthalidone (HYGROTON) 25 mg tablet (Taking) Take 1 Tablet by mouth daily. spironolactone (Aldactone) 50 mg tablet (Taking) Take 1 Tablet by mouth daily. metoprolol succinate (TOPROL-XL) 200 mg XL tablet (Taking) Take 1 Tablet by mouth daily. anastrozole (ARIMIDEX) 1 mg tablet (Taking) Take 1 mg by mouth daily. albuterol (PROVENTIL HFA, VENTOLIN HFA, PROAIR HFA) 90 mcg/actuation inhaler (Taking) Take 1 Puff by inhalation every six (6) hours as needed for Wheezing. acetaminophen (TYLENOL) 325 mg tablet (Taking) Take 650 mg by mouth every six (6) hours as needed for Pain. mineral oil-hydrophil petrolat (Aquaphor) ointment (Taking) Apply  to affected area as needed for Dry Skin. beclomethasone (Qvar) 40 mcg/actuation aero (Taking) Take 1 Puff by inhalation two (2) times a day. cholecalciferol (VITAMIN D3) (1000 Units /25 mcg) tablet (Taking) Take 1,000 Units by mouth daily. dexAMETHasone (Decadron) 4 mg tablet (Taking) Take 4 mg by mouth every twelve (12) hours. diclofenac (Voltaren) 1 % gel (Taking) Apply 2 g to affected area four (4) times daily. folic acid (FOLVITE) 1 mg tablet (Taking) Take 1 mg by mouth daily. interferon beta-1a (AVONEX) 30 mcg/0.5 mL injection (Taking) 30 mcg by IntraMUSCular route once. melatonin 3 mg tablet (Taking) Take 3 mg by mouth nightly as needed for Insomnia.   meloxicam (MOBIC) 7.5 mg tablet (Taking) Take 7.5 mg by mouth daily. metFORMIN (GLUCOPHAGE) 500 mg tablet (Taking) Take 500 mg by mouth two (2) times daily (with meals). mometasone (Asmanex Twisthaler) 220 mcg/ actuation (14) inhaler (Taking) Take 1 Puff by inhalation daily. omeprazole (PriLOSEC OTC) 20 mg tablet (Taking) Take 20 mg by mouth daily. ondansetron hcl (ZOFRAN) 8 mg tablet (Taking) Take 8 mg by mouth every eight (8) hours as needed for Nausea or Vomiting. potassium chloride SR (Klor-Con 10) 10 mEq tablet (Taking) Take 10 mEq by mouth daily. pregabalin (Lyrica) 25 mg capsule (Taking) Take 100 mg by mouth two (2) times a day. prochlorperazine (Compazine) 5 mg tablet (Taking) Take 5 mg by mouth every six (6) hours as needed for Nausea or Vomiting. raNITIdine (ZANTAC) 300 mg tab (Taking) Take 300 mg by mouth daily. senna (Senna) 8.6 mg tablet (Taking) Take 1 Tablet by mouth daily as needed for Constipation. trospium (SANCTURA) 20 mg tablet (Taking) Take 20 mg by mouth Before breakfast, lunch, and dinner. lactulose (CHRONULAC) 10 gram/15 mL solution Take 1 tsp by mouth daily as needed.    polyethylene glycol (Miralax) 17 gram packet Take 17 g by mouth daily as needed for Constipation.      Visit Diagnoses         Right lumbar radiculopathy       Problem List

## 2022-01-18 NOTE — PERIOP NOTES
Patient tolerated procedure well. No complications noted. VSS. No redness, swelling, or bleeding from injection site. Dressing dry and intact. Armband removed and shredded. Patient wheeled  to main entrance by RN  and discharged alive and well, in stable condition.

## 2022-01-18 NOTE — PROCEDURES
SELECTIVE NERVE ROOT BLOCK PROCEDURE NOTE      Patient Name: Eddie Guillory  Date of Procedure: January 18, 2022  Preoperative Diagnosis:  Lumbar radiculopathy [M54.16]  Post Operative Diagnosis:  Lumbar radiculopathy [M54.16]  Location:  [address] [place] [address]    Procedure :    right S1 Selective Nerve Root Block      Consent:  Informed consent was obtained prior to the procedure. The patient was given the opportunity to ask questions regarding the procedure and its associated risks. In addition to the potential risks associated with the procedure itself, the patient was informed both verbally and in writing of the potential side effects of the use of glucocorticoid. The patient appeared to comprehend the informed consent and desired to have the procedure performed. Procedure: The patient was placed in the prone position on the fluoroscopy table and the back was prepped and draped in the usual sterile manner. The exact spinal level was  identified using fluoroscopy, and Lidocaine 1 % was injected locally, a 22 gauge spinal needle was passed to the transverse process. The depth was noted and the needle redirected to pass inferior and approximately one cm anterior to the transverse process. YES  1 cc of Isovue M-200 was used to verify positioning in the epidural and paravertebral space and outlined the course of the spinal nerve into the epidural space. The same procedure was repeated at each spinal level indicated above. No vascular uptake was identified. A total of 10 mg of preservative free Dexamethasone and 1 cc of Lidocaine/site was slowly injected. The patient tolerated the procedure well. The injection area was cleaned and bandaids applied. Not excessive bleeding was noted. Patient dressed and discharged to home with instructions. Discussion: The patient tolerated the procedure well.  Patient reported tina-procedural pain on Visual Analog Scale:  pre-8; post-2.                                               Marlon Palafox MD  January 18, 2022

## 2022-01-18 NOTE — DISCHARGE INSTRUCTIONS
[facility]   ([facility])  Dr. Janny Mahajan, Dr. Yareli Kidd, Dr. Manju Velasquez Spinal Procedure (Block) Instructions    * Do not drive a car, operate heavy machinery or dangerous equipment, or make important decisions for 12-24 hours. * Light activity as tolerated; may rest for the remainder of the day. * Resume pre-block medications including those from your other doctors. * Do not drink alcoholic beverages for 24 hours. Alcohol and the medications you have received may interact and cause an adverse reaction. * You may feel better this evening and worse tomorrow, as the numbing medications wears off and the steroid has yet to begin to work. After 48-72 hrs the steroid should begin to release bringing you relief. If you had a medial branch block, no steroids were used. The medial branch block is a test to see if you are a candidate for radiofrequency ablation (RFA). The anesthetic (numbing medicine)  will wear off by the next day. * You may shower this evening and remove any bandages. * Avoid hot tubs/pools/tub soaks and heating pads for 24 hours. You may use cold packs on the procedure site as tolerated for the first 24 hours. * If a headache develops, drink plenty of fluids and rest.  Take over the counter medications for headache if needed. If the headache continues longer than 24 hours, call MD at the [address] [place] [address] Avenue. [phone]    * Continue taking pain medications as needed. * You may resume your regular diet if tolerated. Otherwise, start with sips of water and advance slowly. * If Diabetic: check your blood sugar three times a day for the next 3 days. If your sugar is greater than 300 call your family doctor. If your sugar is greater than 400, have someone transport you to the nearest Emergency Room. * If you experience any of the following problems, Please Call the [address] [place] [address] Avenue at [phone].         * Excessive pain, swelling, redness or odor at or around the surgical area    * Fever of 101 or higher    * Nausea / Vomiting lasting longer than 4 hours or if unable to take medications. * Severe Headache    * Weakness or numbness in arms or legs that is not      resolving   * Any NEW signs of decreased circulation or nerve impairment in leg: change in color, swelling, persistent numbness, tingling                    * Prolonged increase in pain greater than 4 days      PATIENT INSTRUCTIONS:    After oral sedation, for 12-24 hours or while taking prescription Narcotics:  · Limit your activities  · Do not drive and operate hazardous machinery  · Do not make important personal or business decisions  · Do  not drink alcoholic beverages  · If you have not urinated within 8 hours after discharge, please contact your surgeon on call. *  Please give a list of your current medications to your Primary Care Provider. *  Please update this list whenever your medications are discontinued, doses are      changed, or new medications (including over-the-counter products) are added. *  Please carry medication information at all times in case of emergency situations. These are general instructions for a healthy lifestyle:    No smoking/ No tobacco products/ Avoid exposure to second hand smoke    Surgeon General's Warning:  Quitting smoking now greatly reduces serious risk to your health. Obesity, smoking, and sedentary lifestyle greatly increases your risk for illness    A healthy diet, regular physical exercise & weight monitoring are important for maintaining a healthy lifestyle    You may be retaining fluid if you have a history of heart failure or if you experience any of the following symptoms:  Weight gain of 3 pounds or more overnight or 5 pounds in a week, increased swelling in our hands or feet or shortness of breath while lying flat in bed.   Please call your doctor as soon as you notice any of these symptoms; do not wait until your next office visit. Recognize signs and symptoms of STROKE:    F-face looks uneven    A-arms unable to move or move unevenly    S-speech slurred or non-existent    T-time-call 911 as soon as signs and symptoms begin-DO NOT go       Back to bed or wait to see if you get better-TIME IS BRAIN.

## 2022-02-01 RX ORDER — PREGABALIN 100 MG/1
100 CAPSULE ORAL 2 TIMES DAILY
COMMUNITY

## 2022-02-01 NOTE — PERIOP NOTES
PRE-SURGICAL INSTRUCTIONS        Patient's Name:  Gregg Rojas      WHBQZ'E Date:  2/1/2022            Covid Testing Date and Time: 2/3 GLST    Surgery Date:  2/7/2022                1. Do NOT eat or drink anything, including candy, gum, or ice chips after midnight on 02/06, unless you have specific instructions from your surgeon or anesthesia provider to do so.  2. You may brush your teeth before coming to the hospital.  3. No smoking 24 hours prior to the day of surgery. 4. No alcohol 24 hours prior to the day of surgery. 5. No recreational drugs for one week prior to the day of surgery. 6. Leave all valuables, including money/purse, at home. 7. Remove all jewelry, nail polish, acrylic nails, and makeup (including mascara); no lotions powders, deodorant, or perfume/cologne/after shave on the skin. 8. Follow instruction for Hibiclens washes and CHG wipes from surgeon's office. 9. Glasses/contact lenses and dentures may be worn to the hospital.  They will be removed prior to surgery. 10. Call your doctor if symptoms of a cold or illness develop within 24-48 hours prior to your surgery. 11.  If you are having an outpatient procedure, please make arrangements for a responsible ADULT TO [address] [place] [address] and stay with you for 24 hours after your surgery. 12. ONE VISITOR in the hospital at this time for outpatient procedures. Exceptions may be made for surgical admissions, per nursing unit guidelines      Special Instructions:      Bring list of CURRENT medications. Bring inhaler. Bring any pertinent legal medical records. Take these medications the morning of surgery with a sip of water:  Metoprolol , Benezipril , Amlodipine. Use inhalers. Follow physician instructions about stopping anticoagulants. Complete bowel prep per MD instructions. On the day of surgery, come in the main entrance of [facility]. Let the  at the desk know you are there for surgery. A staff member will come escort you to the surgical area on the second floor. If you have any questions or concerns, please do not hesitate to call:     (Prior to the day of surgery) PAT department:  [phone]   (Day of surgery) Pre-Op department:  [phone]    These surgical instructions were reviewed with Paz during the [facility] phone call.

## 2022-02-04 ENCOUNTER — ANESTHESIA EVENT (OUTPATIENT)
Dept: ENDOSCOPY | Age: 62
End: 2022-02-04
Payer: MEDICARE

## 2022-02-07 ENCOUNTER — ANESTHESIA (OUTPATIENT)
Dept: ENDOSCOPY | Age: 62
End: 2022-02-07
Payer: MEDICARE

## 2022-02-07 ENCOUNTER — HOSPITAL ENCOUNTER (OUTPATIENT)
Age: 62
Setting detail: OUTPATIENT SURGERY
Discharge: HOME | End: 2022-02-07
Attending: INTERNAL MEDICINE | Admitting: INTERNAL MEDICINE
Payer: MEDICARE

## 2022-02-07 VITALS
HEART RATE: 94 BPM | WEIGHT: 226.8 LBS | TEMPERATURE: 98 F | RESPIRATION RATE: 20 BRPM | SYSTOLIC BLOOD PRESSURE: 162 MMHG | HEIGHT: 59 IN | OXYGEN SATURATION: 98 % | BODY MASS INDEX: 45.72 KG/M2 | DIASTOLIC BLOOD PRESSURE: 98 MMHG

## 2022-02-07 LAB
GLUCOSE BLD STRIP.AUTO-MCNC: 112 MG/DL (ref 70–110)
GLUCOSE BLD STRIP.AUTO-MCNC: 149 MG/DL (ref 70–110)

## 2022-02-07 PROCEDURE — 00811 ANES LWR INTST NDSC NOS: CPT | Performed by: ANESTHESIOLOGY

## 2022-02-07 PROCEDURE — 77030021593 HC FCPS BIOP ENDOSC BSC -A: Performed by: INTERNAL MEDICINE

## 2022-02-07 PROCEDURE — 74011000250 HC RX REV CODE- 250: Performed by: NURSE ANESTHETIST, CERTIFIED REGISTERED

## 2022-02-07 PROCEDURE — 77030008565 HC TBNG SUC IRR ERBE -B: Performed by: INTERNAL MEDICINE

## 2022-02-07 PROCEDURE — 74011250636 HC RX REV CODE- 250/636: Performed by: NURSE ANESTHETIST, CERTIFIED REGISTERED

## 2022-02-07 PROCEDURE — 77030013992 HC SNR POLYP ENDOSC BSC -B: Performed by: INTERNAL MEDICINE

## 2022-02-07 PROCEDURE — 00811 ANES LWR INTST NDSC NOS: CPT | Performed by: NURSE ANESTHETIST, CERTIFIED REGISTERED

## 2022-02-07 PROCEDURE — 82962 GLUCOSE BLOOD TEST: CPT

## 2022-02-07 PROCEDURE — 2709999900 HC NON-CHARGEABLE SUPPLY: Performed by: INTERNAL MEDICINE

## 2022-02-07 PROCEDURE — 76060000032 HC ANESTHESIA 0.5 TO 1 HR: Performed by: INTERNAL MEDICINE

## 2022-02-07 PROCEDURE — 76040000007: Performed by: INTERNAL MEDICINE

## 2022-02-07 PROCEDURE — 88305 TISSUE EXAM BY PATHOLOGIST: CPT

## 2022-02-07 PROCEDURE — 74011250637 HC RX REV CODE- 250/637: Performed by: NURSE ANESTHETIST, CERTIFIED REGISTERED

## 2022-02-07 RX ORDER — LIDOCAINE HYDROCHLORIDE 10 MG/ML
0.1 INJECTION, SOLUTION EPIDURAL; INFILTRATION; INTRACAUDAL; PERINEURAL AS NEEDED
Status: DISCONTINUED | OUTPATIENT
Start: 2022-02-07 | End: 2022-02-07 | Stop reason: HOSPADM

## 2022-02-07 RX ORDER — INSULIN LISPRO 100 [IU]/ML
INJECTION, SOLUTION INTRAVENOUS; SUBCUTANEOUS ONCE
Status: CANCELLED | OUTPATIENT
Start: 2022-02-07 | End: 2022-02-07

## 2022-02-07 RX ORDER — PROPOFOL 10 MG/ML
INJECTION, EMULSION INTRAVENOUS AS NEEDED
Status: DISCONTINUED | OUTPATIENT
Start: 2022-02-07 | End: 2022-02-07 | Stop reason: HOSPADM

## 2022-02-07 RX ORDER — INSULIN LISPRO 100 [IU]/ML
INJECTION, SOLUTION INTRAVENOUS; SUBCUTANEOUS ONCE
Status: DISCONTINUED | OUTPATIENT
Start: 2022-02-07 | End: 2022-02-07 | Stop reason: HOSPADM

## 2022-02-07 RX ORDER — SODIUM CHLORIDE 0.9 % (FLUSH) 0.9 %
5-40 SYRINGE (ML) INJECTION AS NEEDED
Status: CANCELLED | OUTPATIENT
Start: 2022-02-07

## 2022-02-07 RX ORDER — MAGNESIUM SULFATE 100 %
4 CRYSTALS MISCELLANEOUS AS NEEDED
Status: CANCELLED | OUTPATIENT
Start: 2022-02-07

## 2022-02-07 RX ORDER — DEXTROSE 50 % IN WATER (D50W) INTRAVENOUS SYRINGE
25-50 AS NEEDED
Status: CANCELLED | OUTPATIENT
Start: 2022-02-07

## 2022-02-07 RX ORDER — SODIUM CHLORIDE 0.9 % (FLUSH) 0.9 %
5-40 SYRINGE (ML) INJECTION EVERY 8 HOURS
Status: CANCELLED | OUTPATIENT
Start: 2022-02-07

## 2022-02-07 RX ORDER — LIDOCAINE HYDROCHLORIDE 20 MG/ML
INJECTION, SOLUTION EPIDURAL; INFILTRATION; INTRACAUDAL; PERINEURAL AS NEEDED
Status: DISCONTINUED | OUTPATIENT
Start: 2022-02-07 | End: 2022-02-07 | Stop reason: HOSPADM

## 2022-02-07 RX ORDER — SODIUM CHLORIDE, SODIUM LACTATE, POTASSIUM CHLORIDE, CALCIUM CHLORIDE 600; 310; 30; 20 MG/100ML; MG/100ML; MG/100ML; MG/100ML
25 INJECTION, SOLUTION INTRAVENOUS CONTINUOUS
Status: DISCONTINUED | OUTPATIENT
Start: 2022-02-07 | End: 2022-02-07 | Stop reason: HOSPADM

## 2022-02-07 RX ORDER — SODIUM CHLORIDE, SODIUM LACTATE, POTASSIUM CHLORIDE, CALCIUM CHLORIDE 600; 310; 30; 20 MG/100ML; MG/100ML; MG/100ML; MG/100ML
25 INJECTION, SOLUTION INTRAVENOUS CONTINUOUS
Status: CANCELLED | OUTPATIENT
Start: 2022-02-07

## 2022-02-07 RX ORDER — FAMOTIDINE 20 MG/1
20 TABLET, FILM COATED ORAL ONCE
Status: COMPLETED | OUTPATIENT
Start: 2022-02-07 | End: 2022-02-07

## 2022-02-07 RX ADMIN — PROPOFOL 80 MG: 10 INJECTION, EMULSION INTRAVENOUS at 07:53

## 2022-02-07 RX ADMIN — PROPOFOL 20 MG: 10 INJECTION, EMULSION INTRAVENOUS at 08:03

## 2022-02-07 RX ADMIN — SODIUM CHLORIDE, SODIUM LACTATE, POTASSIUM CHLORIDE, AND CALCIUM CHLORIDE: 600; 310; 30; 20 INJECTION, SOLUTION INTRAVENOUS at 07:45

## 2022-02-07 RX ADMIN — PROPOFOL 20 MG: 10 INJECTION, EMULSION INTRAVENOUS at 07:55

## 2022-02-07 RX ADMIN — LIDOCAINE HYDROCHLORIDE 100 MG: 20 INJECTION, SOLUTION EPIDURAL; INFILTRATION; INTRACAUDAL; PERINEURAL at 08:12

## 2022-02-07 RX ADMIN — PROPOFOL 20 MG: 10 INJECTION, EMULSION INTRAVENOUS at 08:07

## 2022-02-07 RX ADMIN — FAMOTIDINE 20 MG: 20 TABLET ORAL at 06:43

## 2022-02-07 RX ADMIN — PROPOFOL 20 MG: 10 INJECTION, EMULSION INTRAVENOUS at 07:57

## 2022-02-07 RX ADMIN — PROPOFOL 20 MG: 10 INJECTION, EMULSION INTRAVENOUS at 08:12

## 2022-02-07 RX ADMIN — PROPOFOL 20 MG: 10 INJECTION, EMULSION INTRAVENOUS at 08:00

## 2022-02-07 NOTE — PROGRESS NOTES
conducted a pre-surgery visit with Colleen Acevedo, who is a 57 y.o.,female. The  provided the following Interventions:  Initiated a relationship of care and support. Offered prayer and assurance of continued prayers on patient's behalf. There is no advance directive present. Plan:  Chaplains will continue to follow and will provide pastoral care on an as needed/requested basis.  recommends bedside caregivers page  on duty if patient shows signs of acute spiritual or emotional distress.     Reiseñor 3   Board Certified [address] [place] [address]   [phone]

## 2022-02-07 NOTE — ROUTINE PROCESS
0720: Notified Dr. Matteo Monge, Anesthesia of pt's BP of 175/104. Pt has not taken her BP meds since this weekend. No new orders received.

## 2022-02-07 NOTE — ANESTHESIA PREPROCEDURE EVALUATION
Relevant Problems   No relevant active problems       Anesthetic History   No history of anesthetic complications            Review of Systems / Medical History  Patient summary reviewed and pertinent labs reviewed    Pulmonary        Sleep apnea: CPAP    Asthma : well controlled       Neuro/Psych   Within defined limits           Cardiovascular    Hypertension          CAD         GI/Hepatic/Renal     GERD           Endo/Other    Diabetes    Morbid obesity and arthritis     Other Findings              Physical Exam    Airway  Mallampati: II  TM Distance: 4 - 6 cm  Neck ROM: normal range of motion   Mouth opening: Normal     Cardiovascular  Regular rate and rhythm,  S1 and S2 normal,  no murmur, click, rub, or gallop             Dental      Comments: Loose front lower right tooth   Pulmonary  Breath sounds clear to auscultation               Abdominal  GI exam deferred       Other Findings            Anesthetic Plan    ASA: 3  Anesthesia type: MAC          Induction: Intravenous  Anesthetic plan and risks discussed with: Patient

## 2022-02-07 NOTE — PROCEDURES
[email or web address]  [phone]        Brief Procedure Note    Colleen Acevedo  1960  [id number]    Date of Procedure: 2/7/2022    Preoperative diagnosis: Personal history of colon polyp:  V12.72 - Z86.010    Postoperative diagnosis: Transverese Colon Polyps x 2, Diverticulosis, Sigmoid Polyp x 1, Internal Hemorrhoids    Description of Findings: same    Sedation/Anesthesia: Monitored Anesthesia Care; See Anesthesia Note    Procedure: Procedure(s):  COLONOSCOPY with Polypectomies    :  Dr. Hetal Lee MD    Assistant(s): Endoscopy Technician-1: AMCAD Marker  Endoscopy RN-1: Vangie Prabhakar RN    EBL:None    Specimens:   ID Type Source Tests Collected by Time Destination   1 : Transverse Polyps Preservative Colon, Transverse  Beth Hood MD 2/7/2022 0805 Pathology   2 : Sigmoid Polyp Preservative Sigmoid  Beth Hood MD 2/7/2022 0815 Pathology       Findings: See printed and scanned procedure note    Complications: None    Tissue Implant Device: None    Dr. Hetal Lee MD  2/7/2022  8:22 AM    Hetal Lee MD  [facility] 1948, [address] [facility]  cell - [phone]  [email or web address]

## 2022-02-07 NOTE — DISCHARGE INSTRUCTIONS
Patient Education        Colonoscopy: What to Expect at Home  Your Recovery  After a colonoscopy, you'll stay at the clinic until you wake up. Then you can go home. But you'll need to arrange for a ride. Your doctor will tell you when you can eat and do your other usual activities. Your doctor will talk to you about when you'll need your next colonoscopy. Your doctor can help you decide how often you need to be checked. This will depend on the results of your test and your risk for colorectal cancer. After the test, you may be bloated or have gas pains. You may need to pass gas. If a biopsy was done or a polyp was removed, you may have streaks of blood in your stool (feces) for a few days. Problems such as heavy rectal bleeding may not occur until several weeks after the test. This isn't common. But it can happen after polyps are removed. This care sheet gives you a general idea about how long it will take for you to recover. But each person recovers at a different pace. Follow the steps below to get better as quickly as possible. How can you care for yourself at home? Activity    · Rest when you feel tired.     · You can do your normal activities when it feels okay to do so. Diet    · Follow your doctor's directions for eating.     · Unless your doctor has told you not to, drink plenty of fluids. This helps to replace the fluids that were lost during the colon prep.     · Do not drink alcohol. Medicines    · Your doctor will tell you if and when you can restart your medicines. You will also be given instructions about taking any new medicines.     · If you take aspirin or some other blood thinner, ask your doctor if and when to start taking it again. Make sure that you understand exactly what your doctor wants you to do.     · If polyps were removed or a biopsy was done during the test, your doctor may tell you not to take aspirin or other anti-inflammatory medicines for a few days.  These include ibuprofen (Advil, Motrin) and naproxen (Aleve). Other instructions    · For your safety, do not drive or operate machinery until the medicine wears off and you can think clearly. Your doctor may tell you not to drive or operate machinery until the day after your test.     · Do not sign legal documents or make major decisions until the medicine wears off and you can think clearly. The anesthesia can make it hard for you to fully understand what you are agreeing to. Follow-up care is a key part of your treatment and safety. Be sure to make and go to all appointments, and call your doctor if you are having problems. It's also a good idea to know your test results and keep a list of the medicines you take. When should you call for help? Call 911 anytime you think you may need emergency care. For example, call if:    · You passed out (lost consciousness).     · You pass maroon or bloody stools.     · You have trouble breathing. Call your doctor now or seek immediate medical care if:    · You have pain that does not get better after you take pain medicine.     · You are sick to your stomach or cannot drink fluids.     · You have new or worse belly pain.     · You have blood in your stools.     · You have a fever.     · You cannot pass stools or gas. Watch closely for changes in your health, and be sure to contact your doctor if you have any problems. Where can you learn more? Go to http://www.gray.com/  Enter E264 in the search box to learn more about \"Colonoscopy: What to Expect at Home. \"  Current as of: December 17, 2020               Content Version: 13.0  © 6655-5850 Healthwise, Incorporated. Care instructions adapted under license by [employer] (which disclaims liability or warranty for this information).  If you have questions about a medical condition or this instruction, always ask your healthcare professional. Orlando [employer]ae disclaims any warranty or liability for your use of this information. These are general instructions for a healthy lifestyle:    No smoking/ No tobacco products/ Avoid exposure to second hand smoke  Surgeon General's Warning:  Quitting smoking now greatly reduces serious risk to your health. Obesity, smoking, and sedentary lifestyle greatly increases your risk for illness    A healthy diet, regular physical exercise & weight monitoring are important for maintaining a healthy lifestyle    You may be retaining fluid if you have a history of heart failure or if you experience any of the following symptoms:  Weight gain of 3 pounds or more overnight or 5 pounds in a week, increased swelling in our hands or feet or shortness of breath while lying flat in bed. Please call your doctor as soon as you notice any of these symptoms; do not wait until your next office visit. The discharge information has been reviewed with the patient and spouse. The patient and spouse verbalized understanding. Discharge medications reviewed with the patient and spouse and appropriate educational materials and side effects teaching were provided.   ___________________________________________________________________________________________________________________________________     Patient armband removed and shredded

## 2022-02-07 NOTE — ANESTHESIA POSTPROCEDURE EVALUATION
Procedure(s):  COLONOSCOPY with Polypectomies.     MAC    Anesthesia Post Evaluation      Multimodal analgesia: multimodal analgesia used between 6 hours prior to anesthesia start to PACU discharge  Patient location during evaluation: PACU  Patient participation: complete - patient participated  Level of consciousness: awake and alert  Pain management: adequate  Airway patency: patent  Anesthetic complications: no  Cardiovascular status: acceptable  Respiratory status: acceptable  Hydration status: acceptable  Post anesthesia nausea and vomiting:  controlled  Final Post Anesthesia Temperature Assessment:  Normothermia (36.0-37.5 degrees C)      INITIAL Post-op Vital signs:   Vitals Value Taken Time   /90 02/07/22 0841   Temp 36.6 °C (97.8 °F) 02/07/22 0825   Pulse 80 02/07/22 0849   Resp 17 02/07/22 0849   SpO2 98 % 02/07/22 0849

## 2022-02-07 NOTE — H&P
[email or web address]  [phone]      History and Physical    Patient: Magdaleno Shane MRN: [id number]  SSN: [id number]    YOB: 1960  Age: 57 y.o. Sex: female      Subjective:      Magdaleno Shane is a 57 y.o. female who presents for increased risk CRCS. Pt has a personal history of colon polyps. Denies symptoms or concerns. .     Past Medical History:   Diagnosis Date    Anemia     Arthritis     Asthma     Well controlled    Autoimmune disease ([address]     Back pain     Breast cancer ([address] 08/2020    left     Cervical cancer ([address] 11/09/2013    Diabetes ([facility]     NIDDM    GERD (gastroesophageal reflux disease)     Heart failure (HCC)     h/o CVA right sided weakness per cardiac note 11/2021 cc    High cholesterol     Hypertension     Lumbar herniated disc     L4/L5, S1/S2    MI (mitral incompetence) 2002    MI (myocardial infarction) ([address] 2000, 2009    MS (multiple sclerosis) ([address]     Right knee pain     Sleep apnea     CPAP    Thromboembolism of deep veins of lower extremity, left ([address] 2002    Unexplained weight gain     Wheelchair dependent 2006    MS     Past Surgical History:   Procedure Laterality Date    HX HEART CATHETERIZATION  09/04/2020    Minor blockages    HX HYSTERECTOMY  11/06/2013    HX MASTECTOMY Left 12/07/2020    HX OOPHORECTOMY  11/06/2013      History reviewed. No pertinent family history. Social History     Tobacco Use    Smoking status: Never Smoker    Smokeless tobacco: Never Used   Substance Use Topics    Alcohol use: Never      Prior to Admission medications    Medication Sig Start Date End Date Taking? Authorizing Provider   pregabalin (Lyrica) 100 mg capsule Take 100 mg by mouth two (2) times a day. Yes Provider, Historical   eszopiclone (LUNESTA) 3 mg tablet Take 3 mg by mouth nightly. 11/29/21  Yes Provider, Historical   ibuprofen (MOTRIN) 800 mg tablet Take 800 mg by mouth every six (6) hours as needed.  11/29/21  Yes Provider, Historical promethazine (PHENERGAN) 25 mg tablet Take 25 mg by mouth every six (6) hours as needed. Yes Provider, Historical   pregabalin (Lyrica) 200 mg capsule Take 200 mg by mouth nightly. Yes Provider, Historical   benazepriL (LOTENSIN) 40 mg tablet Take 1 Tablet by mouth daily. 11/23/21  Yes Jamil Guardado P, NP   amLODIPine (Norvasc) 10 mg tablet Take 1 Tablet by mouth daily. 11/12/21  Yes Corazon Rao, DO   aspirin delayed-release 81 mg tablet Take 1 Tablet by mouth daily. 11/12/21  Yes Corazon Rao, DO   atorvastatin (Lipitor) 40 mg tablet Take 1 Tablet by mouth daily. 11/12/21  Yes Corazon Rao, DO   chlorthalidone (HYGROTON) 25 mg tablet Take 1 Tablet by mouth daily. 11/12/21  Yes Corazon Rao, DO   spironolactone (Aldactone) 50 mg tablet Take 1 Tablet by mouth daily. 11/12/21  Yes Corazon Rao, DO   metoprolol succinate (TOPROL-XL) 200 mg XL tablet Take 1 Tablet by mouth daily. 11/12/21  Yes Corazon Rao, DO   anastrozole (ARIMIDEX) 1 mg tablet Take 1 mg by mouth daily. 10/4/21  Yes Provider, Historical   albuterol (PROVENTIL HFA, VENTOLIN HFA, PROAIR HFA) 90 mcg/actuation inhaler Take 1 Puff by inhalation every six (6) hours as needed for Wheezing. Yes Provider, Historical   acetaminophen (TYLENOL) 325 mg tablet Take 650 mg by mouth every six (6) hours as needed for Pain. Yes Provider, Historical   mineral oil-hydrophil petrolat (Aquaphor) ointment Apply  to affected area as needed for Dry Skin. Yes Provider, Historical   beclomethasone (Qvar) 40 mcg/actuation aero Take 1 Puff by inhalation two (2) times a day. Yes Provider, Historical   cholecalciferol (VITAMIN D3) (1000 Units /25 mcg) tablet Take 2,000 Units by mouth daily. Yes Provider, Historical   dexAMETHasone (Decadron) 4 mg tablet Take 4 mg by mouth every twelve (12) hours. Yes Provider, Historical   diclofenac (Voltaren) 1 % gel Apply 2 g to affected area four (4) times daily.    Yes Provider, Historical   folic acid (FOLVITE) 1 mg tablet Take 1 mg by mouth daily. Yes Provider, Historical   interferon beta-1a (AVONEX) 30 mcg/0.5 mL injection 30 mcg by IntraMUSCular route every seven (7) days. Yes Provider, Historical   melatonin 3 mg tablet Take 3 mg by mouth nightly as needed for Insomnia. Yes Provider, Historical   meloxicam (MOBIC) 7.5 mg tablet Take 7.5 mg by mouth daily as needed. Yes Provider, Historical   metFORMIN (GLUCOPHAGE) 500 mg tablet Take 500 mg by mouth two (2) times daily (with meals). Yes Provider, Historical   mometasone (Asmanex Twisthaler) 220 mcg/ actuation (14) inhaler Take 1 Puff by inhalation daily. Yes Provider, Historical   omeprazole (PriLOSEC OTC) 20 mg tablet Take 20 mg by mouth daily. Yes Provider, Historical   ondansetron hcl (ZOFRAN) 8 mg tablet Take 8 mg by mouth every eight (8) hours as needed for Nausea or Vomiting. Yes Provider, Historical   potassium chloride SR (Klor-Con 10) 10 mEq tablet Take 10 mEq by mouth daily. Yes Provider, Historical   prochlorperazine (Compazine) 5 mg tablet Take 5 mg by mouth every six (6) hours as needed for Nausea or Vomiting. Yes Provider, Historical   trospium (SANCTURA) 20 mg tablet Take 20 mg by mouth Before breakfast, lunch, and dinner. Yes Provider, Historical   diazePAM (VALIUM) 5 mg tablet Take 5 mg by mouth as needed. Pre Procedure 11/29/21   Provider, Historical        Allergies   Allergen Reactions    Penicillin G Hives and Rash       Review of Systems:  A comprehensive review of systems was negative except for that written in the History of Present Illness.     Objective:     Vitals:    02/01/22 0842 02/07/22 0700   BP:  (!) 175/104   Pulse:  96   Resp:  20   Temp:  97.8 °F (36.6 °C)   SpO2:  95%   Weight: 99.8 kg (220 lb) 102.9 kg (226 lb 12.8 oz)   Height: 4' 11.75\" (1.518 m) 4' 11\" (1.499 m)        Physical Exam:  GENERAL: alert, cooperative, no distress, appears stated age  LUNG: clear to auscultation bilaterally  HEART: regular rate and rhythm, S1, S2 normal, no murmur, click, rub or gallop  ABDOMEN: soft, non-tender. Bowel sounds normal. No masses,  no organomegaly  NEUROLOGIC: alert & oriented x 3    Assessment:     1. Personal history of colon polyps    Plan:     1. Colonoscopy    Signed By: Ruthie Roca MD     February 7, 2022      Ruthie Roca MD  [facility] [place] [address], [address] [place] [address] [phone]  [email or web address]

## 2022-02-10 ENCOUNTER — TELEPHONE (OUTPATIENT)
Dept: ORTHOPEDIC SURGERY | Age: 62
End: 2022-02-10

## 2022-03-11 ENCOUNTER — OFFICE VISIT (OUTPATIENT)
Dept: ORTHOPEDIC SURGERY | Age: 62
End: 2022-03-11
Payer: MEDICARE

## 2022-03-11 VITALS — WEIGHT: 230 LBS | TEMPERATURE: 97.8 F | BODY MASS INDEX: 46.37 KG/M2 | HEIGHT: 59 IN

## 2022-03-11 DIAGNOSIS — M17.11 PRIMARY OSTEOARTHRITIS OF RIGHT KNEE: Primary | ICD-10-CM

## 2022-03-11 DIAGNOSIS — M25.561 CHRONIC PAIN OF RIGHT KNEE: ICD-10-CM

## 2022-03-11 DIAGNOSIS — G89.29 CHRONIC PAIN OF RIGHT KNEE: ICD-10-CM

## 2022-03-11 PROCEDURE — G8417 CALC BMI ABV UP PARAM F/U: HCPCS | Performed by: ORTHOPAEDIC SURGERY

## 2022-03-11 PROCEDURE — 3017F COLORECTAL CA SCREEN DOC REV: CPT | Performed by: ORTHOPAEDIC SURGERY

## 2022-03-11 PROCEDURE — 99213 OFFICE O/P EST LOW 20 MIN: CPT | Performed by: ORTHOPAEDIC SURGERY

## 2022-03-11 PROCEDURE — 20610 DRAIN/INJ JOINT/BURSA W/O US: CPT | Performed by: ORTHOPAEDIC SURGERY

## 2022-03-11 PROCEDURE — G8427 DOCREV CUR MEDS BY ELIG CLIN: HCPCS | Performed by: ORTHOPAEDIC SURGERY

## 2022-03-11 PROCEDURE — G8432 DEP SCR NOT DOC, RNG: HCPCS | Performed by: ORTHOPAEDIC SURGERY

## 2022-03-11 RX ORDER — TRIAMCINOLONE ACETONIDE 40 MG/ML
40 INJECTION, SUSPENSION INTRA-ARTICULAR; INTRAMUSCULAR ONCE
Status: COMPLETED | OUTPATIENT
Start: 2022-03-11 | End: 2022-03-11

## 2022-03-11 RX ADMIN — TRIAMCINOLONE ACETONIDE 40 MG: 40 INJECTION, SUSPENSION INTRA-ARTICULAR; INTRAMUSCULAR at 10:44

## 2022-03-11 NOTE — PROGRESS NOTES
Patient: Sierra Delgado                MRN: [id number]       SSN: [id number]  YOB: 1960        AGE: 57 y.o. SEX: female  Body mass index is 46.45 kg/m². PCP: Case Walton DO  03/11/22    Chief Complaint: Right knee pain     HPI: Sierra Delgado is a 57 y.o. female patient who returns to the office today for her right knee. At her last visit she received a corticosteroid injection into her right knee which she said helped for several months. Unfortunately pain is returned. Fortunately she is done with her chemotherapy and radiation. Past Medical History:   Diagnosis Date    Anemia     Arthritis     Asthma     Well controlled    Autoimmune disease ([address]     Back pain     Breast cancer ([facility] 08/2020    left     Cervical cancer ([facility] 11/09/2013    Diabetes ([facility]     NIDDM    GERD (gastroesophageal reflux disease)     Heart failure (HCC)     h/o CVA right sided weakness per cardiac note 11/2021 cc    High cholesterol     Hypertension     Lumbar herniated disc     L4/L5, S1/S2    MI (mitral incompetence) 2002    MI (myocardial infarction) ([address] 2000, 2009    MS (multiple sclerosis) ([facility]     Right knee pain     Sleep apnea     CPAP    Thromboembolism of deep veins of lower extremity, left ([address] 2002    Unexplained weight gain     Wheelchair dependent 2006    MS       History reviewed. No pertinent family history. Current Outpatient Medications   Medication Sig Dispense Refill    pregabalin (Lyrica) 100 mg capsule Take 100 mg by mouth two (2) times a day.  diazePAM (VALIUM) 5 mg tablet Take 5 mg by mouth as needed. Pre Procedure      eszopiclone (LUNESTA) 3 mg tablet Take 3 mg by mouth nightly.  ibuprofen (MOTRIN) 800 mg tablet Take 800 mg by mouth every six (6) hours as needed.  promethazine (PHENERGAN) 25 mg tablet Take 25 mg by mouth every six (6) hours as needed.       pregabalin (Lyrica) 200 mg capsule Take 200 mg by mouth nightly.  benazepriL (LOTENSIN) 40 mg tablet Take 1 Tablet by mouth daily. 90 Tablet 3    amLODIPine (Norvasc) 10 mg tablet Take 1 Tablet by mouth daily. 90 Tablet 3    aspirin delayed-release 81 mg tablet Take 1 Tablet by mouth daily. 90 Tablet 3    atorvastatin (Lipitor) 40 mg tablet Take 1 Tablet by mouth daily. 90 Tablet 3    chlorthalidone (HYGROTON) 25 mg tablet Take 1 Tablet by mouth daily. 90 Tablet 3    spironolactone (Aldactone) 50 mg tablet Take 1 Tablet by mouth daily. 90 Tablet 3    metoprolol succinate (TOPROL-XL) 200 mg XL tablet Take 1 Tablet by mouth daily. 90 Tablet 3    anastrozole (ARIMIDEX) 1 mg tablet Take 1 mg by mouth daily.  albuterol (PROVENTIL HFA, VENTOLIN HFA, PROAIR HFA) 90 mcg/actuation inhaler Take 1 Puff by inhalation every six (6) hours as needed for Wheezing.  acetaminophen (TYLENOL) 325 mg tablet Take 650 mg by mouth every six (6) hours as needed for Pain.  mineral oil-hydrophil petrolat (Aquaphor) ointment Apply  to affected area as needed for Dry Skin.  beclomethasone (Qvar) 40 mcg/actuation aero Take 1 Puff by inhalation two (2) times a day.  cholecalciferol (VITAMIN D3) (1000 Units /25 mcg) tablet Take 2,000 Units by mouth daily.  dexAMETHasone (Decadron) 4 mg tablet Take 4 mg by mouth every twelve (12) hours.  diclofenac (Voltaren) 1 % gel Apply 2 g to affected area four (4) times daily.  folic acid (FOLVITE) 1 mg tablet Take 1 mg by mouth daily.  interferon beta-1a (AVONEX) 30 mcg/0.5 mL injection 30 mcg by IntraMUSCular route every seven (7) days.  melatonin 3 mg tablet Take 3 mg by mouth nightly as needed for Insomnia.  meloxicam (MOBIC) 7.5 mg tablet Take 7.5 mg by mouth daily as needed.  metFORMIN (GLUCOPHAGE) 500 mg tablet Take 500 mg by mouth two (2) times daily (with meals).  mometasone (Asmanex Twisthaler) 220 mcg/ actuation (14) inhaler Take 1 Puff by inhalation daily.       omeprazole (PriLOSEC OTC) 20 mg tablet Take 20 mg by mouth daily.  ondansetron hcl (ZOFRAN) 8 mg tablet Take 8 mg by mouth every eight (8) hours as needed for Nausea or Vomiting.  potassium chloride SR (Klor-Con 10) 10 mEq tablet Take 10 mEq by mouth daily.  prochlorperazine (Compazine) 5 mg tablet Take 5 mg by mouth every six (6) hours as needed for Nausea or Vomiting.  trospium (SANCTURA) 20 mg tablet Take 20 mg by mouth Before breakfast, lunch, and dinner.        Current Facility-Administered Medications   Medication Dose Route Frequency Provider Last Rate Last Admin    triamcinolone acetonide (KENALOG-40) 40 mg/mL injection 40 mg  40 mg Intra artICUlar ONCE Luis Antonio Alexander MD           Allergies   Allergen Reactions    Penicillin G Hives and Rash       Past Surgical History:   Procedure Laterality Date    COLONOSCOPY N/A 2/7/2022    COLONOSCOPY with Polypectomies performed by Nalini Kirk MD at [address] [place] [address]  09/04/2020    Minor blockages    HX HYSTERECTOMY  11/06/2013    HX MASTECTOMY Left 12/07/2020    HX OOPHORECTOMY  11/06/2013       Social History     Socioeconomic History    Marital status: UNKNOWN     Spouse name: Not on file    Number of children: Not on file    Years of education: Not on file    Highest education level: Not on file   Occupational History    Not on file   Tobacco Use    Smoking status: Never Smoker    Smokeless tobacco: Never Used   Vaping Use    Vaping Use: Never used   Substance and Sexual Activity    Alcohol use: Never    Drug use: Never    Sexual activity: Not on file   Other Topics Concern    Not on file   Social History Narrative    Not on file     Social Determinants of Health     Financial Resource Strain:     Difficulty of Paying Living Expenses: Not on file   Food Insecurity:     Worried About [address] in the Last Year: Not on file    Amie of Food in the Last Year: Not on H. JENNA Moon Needs:     Lack of Transportation (Medical): Not on file    Lack of Transportation (Non-Medical): Not on file   Physical Activity:     Days of Exercise per Week: Not on file    Minutes of Exercise per Session: Not on file   Stress:     Feeling of Stress : Not on file   Social Connections:     Frequency of Communication with Friends and Family: Not on file    Frequency of Social Gatherings with Friends and Family: Not on file    Attends Protestant Services: Not on file    Active Member of [address] [place] [address] or Organizations: Not on file    Attends Club or Organization Meetings: Not on file    Marital Status: Not on file   Intimate Partner Violence:     Fear of Current or Ex-Partner: Not on file    Emotionally Abused: Not on file    Physically Abused: Not on file    Sexually Abused: Not on file   Housing Stability:     Unable to Pay for Housing in the Last Year: Not on file    Number of [place] in the Last Year: Not on file    Unstable Housing in the Last Year: Not on file       REVIEW OF SYSTEMS:      No changes from previous review of systems unless noted. PHYSICAL EXAMINATION:  Visit Vitals  Temp 97.8 °F (36.6 °C)   Ht 4' 11\" (1.499 m)   Wt 230 lb (104.3 kg)   BMI 46.45 kg/m²     Body mass index is 46.45 kg/m². GENERAL: Alert and oriented x3, in no acute distress. HEENT: Normocephalic, atraumatic.     Knee Examination      R   L  Effusion   -   -  Warmth   -   -  Erythema   -   -  ROM   Extension  10   Full   Flexion   120   Full  Tenderness   Medial Joint  -   -   Lateral Joint  -   -   Posterior knee  -   -  Strength   Quad   5   5   Hamstring  5   5  Crepitus   Tibiofemoral   +   -   Patellofemoral  +   -  Instability   Anterior  -   -   Posterior  -   -   Patellofemoral  -   -  Lachman's   -   -  Anterior Drawer  -   -  Posterior Drawer  -   -  Jazzmine's   Pain   -   -   Locking  -   -  Calf TTP   -   -  Straight Leg Raise  -   -      IMAGING:  No imaging today    ASSESSMENT & PLAN  Diagnosis: Right knee osteoarthritis    Paz has symptomatic right knee osteoarthritis. We discussed the treatment options today. Again she has a good candidate based on her knee x-rays for a total knee arthroplasty however with her BMI of above 40 as well as her history of just finishing chemotherapy and radiation for cancer she is not a good candidate for surgery otherwise at this time. She would like to try for symptomatic pain relief on repeat corticosteroid injection. Hyaluronic acid injections will also order for prior authorization today if the corticosteroid is unsuccessful alleviating her pain. I will see her back as needed. Prescription medication management discussed with patient. [facility]  OFFICE PROCEDURE PROGRESS NOTE        Chart reviewed for the following:   Alice Cameron MD, have reviewed the History, Physical and updated the Allergic reactions for [address] performed immediately prior to start of procedure:   Alice Cameron MD, have performed the following reviews on Paz Manuel prior to the start of the procedure:            * Patient was identified by name and date of birth   * Agreement on procedure being performed was verified  * Risks and Benefits explained to the patient  * Procedure site verified and marked as necessary  * Patient was positioned for comfort  * Consent was signed and verified    Time: 10:42 AM    Location: Right knee joint intra-articular injection    Kenalog 40mg & 3cc Lidocaine    Date of procedure: 3/11/2022    Procedure performed by:  Shari Cintron MD    Provider assisted by: Alejo Red LPN    Patient assisted by: self    How tolerated by patient: tolerated the procedure well with no complications    Post Procedural Pain Scale: 0 - No Hurt    Comments: none      Electronically signed by: Shari Cintron MD    Note: This note was completed using voice recognition software.   Any typographical/name errors or mistakes are unintentional.

## 2022-03-23 ENCOUNTER — DOCUMENTATION ONLY (OUTPATIENT)
Dept: ORTHOPEDIC SURGERY | Age: 62
End: 2022-03-23

## 2022-03-31 ENCOUNTER — OFFICE VISIT (OUTPATIENT)
Dept: ORTHOPEDIC SURGERY | Age: 62
End: 2022-03-31
Payer: MEDICARE

## 2022-03-31 VITALS — HEIGHT: 59 IN | BODY MASS INDEX: 46.45 KG/M2 | RESPIRATION RATE: 16 BRPM

## 2022-03-31 DIAGNOSIS — M17.11 PRIMARY OSTEOARTHRITIS OF RIGHT KNEE: Primary | ICD-10-CM

## 2022-03-31 PROCEDURE — 3017F COLORECTAL CA SCREEN DOC REV: CPT | Performed by: ORTHOPAEDIC SURGERY

## 2022-03-31 PROCEDURE — G8432 DEP SCR NOT DOC, RNG: HCPCS | Performed by: ORTHOPAEDIC SURGERY

## 2022-03-31 PROCEDURE — 99213 OFFICE O/P EST LOW 20 MIN: CPT | Performed by: ORTHOPAEDIC SURGERY

## 2022-03-31 PROCEDURE — G8417 CALC BMI ABV UP PARAM F/U: HCPCS | Performed by: ORTHOPAEDIC SURGERY

## 2022-03-31 PROCEDURE — 20610 DRAIN/INJ JOINT/BURSA W/O US: CPT | Performed by: ORTHOPAEDIC SURGERY

## 2022-03-31 PROCEDURE — G8427 DOCREV CUR MEDS BY ELIG CLIN: HCPCS | Performed by: ORTHOPAEDIC SURGERY

## 2022-03-31 RX ORDER — HYALURONATE SODIUM, STABILIZED 60 MG/3 ML
3 SYRINGE (ML) INTRAARTICULAR
Qty: 3 ML | Refills: 0
Start: 2022-03-31 | End: 2022-03-31

## 2022-03-31 NOTE — PROGRESS NOTES
Patient: Mary Hopson                MRN: [id number]       SSN: [id number]  YOB: 1960        AGE: 57 y.o. SEX: female  Body mass index is 46.45 kg/m². PCP: Logan Monsivais DO  03/31/22    Chief Complaint: Right knee pain 7/10    HPI: Mary Hopson is a 57 y.o. female patient who returns to the office today with right knee pain. She continues to have symptomatic right knee pain from underlying osteoarthritis that is failed conservative management up to this point. Past Medical History:   Diagnosis Date    Anemia     Arthritis     Asthma     Well controlled    Autoimmune disease ([address]     Back pain     Breast cancer ([facility] 08/2020    left     Cervical cancer ([facility] 11/09/2013    Diabetes ([facility]     NIDDM    GERD (gastroesophageal reflux disease)     Heart failure (HCC)     h/o CVA right sided weakness per cardiac note 11/2021 cc    High cholesterol     Hypertension     Lumbar herniated disc     L4/L5, S1/S2    MI (mitral incompetence) 2002    MI (myocardial infarction) ([address] 2000, 2009    MS (multiple sclerosis) ([facility]     Right knee pain     Sleep apnea     CPAP    Thromboembolism of deep veins of lower extremity, left ([address] 2002    Unexplained weight gain     Wheelchair dependent 2006    MS       History reviewed. No pertinent family history. Current Outpatient Medications   Medication Sig Dispense Refill    hyaluronate sodium, stabilized (Durolane) 60 mg/3 mL syrg intra articular injection 3 mL by Intra artICUlar route now for 1 dose. 3 mL 0    pregabalin (Lyrica) 100 mg capsule Take 100 mg by mouth two (2) times a day.  diazePAM (VALIUM) 5 mg tablet Take 5 mg by mouth as needed. Pre Procedure      eszopiclone (LUNESTA) 3 mg tablet Take 3 mg by mouth nightly.  ibuprofen (MOTRIN) 800 mg tablet Take 800 mg by mouth every six (6) hours as needed.       promethazine (PHENERGAN) 25 mg tablet Take 25 mg by mouth every six (6) hours as needed.  pregabalin (Lyrica) 200 mg capsule Take 200 mg by mouth nightly.  benazepriL (LOTENSIN) 40 mg tablet Take 1 Tablet by mouth daily. 90 Tablet 3    amLODIPine (Norvasc) 10 mg tablet Take 1 Tablet by mouth daily. 90 Tablet 3    aspirin delayed-release 81 mg tablet Take 1 Tablet by mouth daily. 90 Tablet 3    atorvastatin (Lipitor) 40 mg tablet Take 1 Tablet by mouth daily. 90 Tablet 3    chlorthalidone (HYGROTON) 25 mg tablet Take 1 Tablet by mouth daily. 90 Tablet 3    spironolactone (Aldactone) 50 mg tablet Take 1 Tablet by mouth daily. 90 Tablet 3    metoprolol succinate (TOPROL-XL) 200 mg XL tablet Take 1 Tablet by mouth daily. 90 Tablet 3    anastrozole (ARIMIDEX) 1 mg tablet Take 1 mg by mouth daily.  albuterol (PROVENTIL HFA, VENTOLIN HFA, PROAIR HFA) 90 mcg/actuation inhaler Take 1 Puff by inhalation every six (6) hours as needed for Wheezing.  acetaminophen (TYLENOL) 325 mg tablet Take 650 mg by mouth every six (6) hours as needed for Pain.  mineral oil-hydrophil petrolat (Aquaphor) ointment Apply  to affected area as needed for Dry Skin.  beclomethasone (Qvar) 40 mcg/actuation aero Take 1 Puff by inhalation two (2) times a day.  cholecalciferol (VITAMIN D3) (1000 Units /25 mcg) tablet Take 2,000 Units by mouth daily.  dexAMETHasone (Decadron) 4 mg tablet Take 4 mg by mouth every twelve (12) hours.  diclofenac (Voltaren) 1 % gel Apply 2 g to affected area four (4) times daily.  folic acid (FOLVITE) 1 mg tablet Take 1 mg by mouth daily.  interferon beta-1a (AVONEX) 30 mcg/0.5 mL injection 30 mcg by IntraMUSCular route every seven (7) days.  melatonin 3 mg tablet Take 3 mg by mouth nightly as needed for Insomnia.  meloxicam (MOBIC) 7.5 mg tablet Take 7.5 mg by mouth daily as needed.  metFORMIN (GLUCOPHAGE) 500 mg tablet Take 500 mg by mouth two (2) times daily (with meals).       mometasone (Asmanex Twisthaler) 220 mcg/ actuation (14) inhaler Take 1 Puff by inhalation daily.  omeprazole (PriLOSEC OTC) 20 mg tablet Take 20 mg by mouth daily.  ondansetron hcl (ZOFRAN) 8 mg tablet Take 8 mg by mouth every eight (8) hours as needed for Nausea or Vomiting.  potassium chloride SR (Klor-Con 10) 10 mEq tablet Take 10 mEq by mouth daily.  prochlorperazine (Compazine) 5 mg tablet Take 5 mg by mouth every six (6) hours as needed for Nausea or Vomiting.  trospium (SANCTURA) 20 mg tablet Take 20 mg by mouth Before breakfast, lunch, and dinner. Allergies   Allergen Reactions    Penicillin G Hives and Rash       Past Surgical History:   Procedure Laterality Date    COLONOSCOPY N/A 2/7/2022    COLONOSCOPY with Polypectomies performed by Cecy Cristina MD at [address] [place] [address]  09/04/2020    Minor blockages    HX HYSTERECTOMY  11/06/2013    HX MASTECTOMY Left 12/07/2020    HX OOPHORECTOMY  11/06/2013       Social History     Socioeconomic History    Marital status: UNKNOWN     Spouse name: Not on file    Number of children: Not on file    Years of education: Not on file    Highest education level: Not on file   Occupational History    Not on file   Tobacco Use    Smoking status: Never Smoker    Smokeless tobacco: Never Used   Vaping Use    Vaping Use: Never used   Substance and Sexual Activity    Alcohol use: Never    Drug use: Never    Sexual activity: Not on file   Other Topics Concern    Not on file   Social History Narrative    Not on file     Social Determinants of Health     Financial Resource Strain:     Difficulty of Paying Living Expenses: Not on file   Food Insecurity:     Worried About [address] in the Last Year: Not on file    [address] in the Last Year: Not on file   Transportation Needs:     Lack of Transportation (Medical): Not on file    Lack of Transportation (Non-Medical):  Not on file   Physical Activity:     Days of Exercise per Week: Not on file    Minutes of Exercise per Session: Not on file   Stress:     Feeling of Stress : Not on file   Social Connections:     Frequency of Communication with Friends and Family: Not on file    Frequency of Social Gatherings with Friends and Family: Not on file    Attends Spiritism Services: Not on file    Active Member of [address] [place] [address] or Organizations: Not on file    Attends Club or Organization Meetings: Not on file    Marital Status: Not on file   Intimate Partner Violence:     Fear of Current or Ex-Partner: Not on file    Emotionally Abused: Not on file    Physically Abused: Not on file    Sexually Abused: Not on file   Housing Stability:     Unable to Pay for Housing in the Last Year: Not on file    Number of [place] in the Last Year: Not on file    Unstable Housing in the Last Year: Not on file       REVIEW OF SYSTEMS:      No changes from previous review of systems unless noted. PHYSICAL EXAMINATION:  Visit Vitals  Resp 16   Ht 4' 11\" (1.499 m)   BMI 46.45 kg/m²     Body mass index is 46.45 kg/m². GENERAL: Alert and oriented x3, in no acute distress. HEENT: Normocephalic, atraumatic.     Knee Examination                                                  R                                  L  Effusion                                   -                                   -  Warmth                                   -                                   -  Erythema                                 -                                   -  ROM              Extension                    10                                Full              Flexion                         120                              Full  Tenderness              Medial Joint                 -                                   -              Lateral Joint                -                                   -              Posterior knee             -                                   -  Strength              Quad 5                                  5              Hamstring                    5                                  5  Crepitus              Tibiofemoral                +                                  -              Patellofemoral             +                                  -  Instability              Anterior                       -                                   -              Posterior                      -                                   -              Patellofemoral             -                                   -  Lachman's                               -                                   -  Anterior Drawer                       -                                   -  Posterior Drawer                     -                                   -  Jazzmine's              Pain                             -                                   -              Locking                        -                                   -  Calf TTP                                  -                                   -  Straight Leg Raise                  -                                   -    IMAGING:  Previous imaging reviewed which shows right knee osteoarthritis    ASSESSMENT & PLAN  Diagnosis: Right knee osteoarthritis    Paz has right knee osteoarthritis that continues to be symptomatic despite conservative management. We discussed the treatment options today again at length and she would like to move forward with a hyaluronic acid injection into her right knee which was given. I do think it is possible that in the future she will be looking at surgery but due to her BMI which is little bit higher than we would like to see at this point we will continue to treat this conservatively. Follow-up as needed. Prescription medication management discussed with patient.      [facility]  OFFICE PROCEDURE PROGRESS NOTE        Chart reviewed for the following:   Tremaine Hale MD, have reviewed the History, Physical and updated the Allergic reactions for [address] performed immediately prior to start of procedure:   I, Paula Smith MD, have performed the following reviews on Paz Cam prior to the start of the procedure:            * Patient was identified by name and date of birth   * Agreement on procedure being performed was verified  * Risks and Benefits explained to the patient  * Procedure site verified and marked as necessary  * Patient was positioned for comfort  * Consent was signed and verified    Time: 8:33 AM    Location: Right knee joint intra-articular injection    Durolane 60 mg per 3 mL injected right knee    Date of procedure: 3/31/2022    Procedure performed by:  Paula Smith MD    Provider assisted by: Jenniffer Nickerson LPN    Patient assisted by: self    How tolerated by patient: tolerated the procedure well with no complications    Post Procedural Pain Scale: 0 - No Hurt    Comments: none      Electronically signed by: Paula Smith MD    Note: This note was completed using voice recognition software.   Any typographical/name errors or mistakes are unintentional.

## 2022-05-26 ENCOUNTER — OFFICE VISIT (OUTPATIENT)
Dept: CARDIOLOGY CLINIC | Age: 62
End: 2022-05-26
Payer: MEDICARE

## 2022-05-26 VITALS
HEART RATE: 71 BPM | WEIGHT: 233 LBS | OXYGEN SATURATION: 98 % | HEIGHT: 59 IN | DIASTOLIC BLOOD PRESSURE: 96 MMHG | BODY MASS INDEX: 46.97 KG/M2 | SYSTOLIC BLOOD PRESSURE: 168 MMHG

## 2022-05-26 DIAGNOSIS — R07.9 CHEST PAIN ON EXERTION: Primary | ICD-10-CM

## 2022-05-26 DIAGNOSIS — I10 HTN, GOAL BELOW 140/90: ICD-10-CM

## 2022-05-26 PROCEDURE — 99214 OFFICE O/P EST MOD 30 MIN: CPT | Performed by: INTERNAL MEDICINE

## 2022-05-26 PROCEDURE — G8755 DIAS BP > OR = 90: HCPCS | Performed by: INTERNAL MEDICINE

## 2022-05-26 PROCEDURE — G8432 DEP SCR NOT DOC, RNG: HCPCS | Performed by: INTERNAL MEDICINE

## 2022-05-26 PROCEDURE — G8427 DOCREV CUR MEDS BY ELIG CLIN: HCPCS | Performed by: INTERNAL MEDICINE

## 2022-05-26 PROCEDURE — G8753 SYS BP > OR = 140: HCPCS | Performed by: INTERNAL MEDICINE

## 2022-05-26 PROCEDURE — 3017F COLORECTAL CA SCREEN DOC REV: CPT | Performed by: INTERNAL MEDICINE

## 2022-05-26 PROCEDURE — G8417 CALC BMI ABV UP PARAM F/U: HCPCS | Performed by: INTERNAL MEDICINE

## 2022-05-26 RX ORDER — CHLORTHALIDONE 25 MG/1
25 TABLET ORAL DAILY
Qty: 90 TABLET | Refills: 3 | Status: SHIPPED | OUTPATIENT
Start: 2022-05-26

## 2022-05-26 RX ORDER — AMLODIPINE BESYLATE 10 MG/1
10 TABLET ORAL DAILY
Qty: 90 TABLET | Refills: 3 | Status: SHIPPED | OUTPATIENT
Start: 2022-05-26

## 2022-05-26 RX ORDER — METOPROLOL SUCCINATE 200 MG/1
200 TABLET, EXTENDED RELEASE ORAL DAILY
Qty: 90 TABLET | Refills: 3 | Status: SHIPPED | OUTPATIENT
Start: 2022-05-26 | End: 2022-05-26

## 2022-05-26 RX ORDER — BENAZEPRIL HYDROCHLORIDE 40 MG/1
40 TABLET ORAL DAILY
Qty: 90 TABLET | Refills: 3 | Status: SHIPPED | OUTPATIENT
Start: 2022-05-26

## 2022-05-26 RX ORDER — CARVEDILOL 25 MG/1
25 TABLET ORAL 2 TIMES DAILY WITH MEALS
Qty: 60 TABLET | Refills: 6 | Status: SHIPPED | OUTPATIENT
Start: 2022-05-26

## 2022-05-26 RX ORDER — ATORVASTATIN CALCIUM 40 MG/1
40 TABLET, FILM COATED ORAL DAILY
Qty: 90 TABLET | Refills: 3 | Status: SHIPPED | OUTPATIENT
Start: 2022-05-26

## 2022-05-26 RX ORDER — ASPIRIN 81 MG/1
81 TABLET ORAL DAILY
Qty: 90 TABLET | Refills: 3 | Status: SHIPPED | OUTPATIENT
Start: 2022-05-26

## 2022-05-26 RX ORDER — POTASSIUM CHLORIDE 750 MG/1
10 TABLET, FILM COATED, EXTENDED RELEASE ORAL DAILY
Qty: 90 TABLET | Refills: 2 | Status: SHIPPED | OUTPATIENT
Start: 2022-05-26

## 2022-05-26 RX ORDER — SPIRONOLACTONE 50 MG/1
50 TABLET, FILM COATED ORAL DAILY
Qty: 90 TABLET | Refills: 3 | Status: SHIPPED | OUTPATIENT
Start: 2022-05-26

## 2022-05-26 NOTE — PROGRESS NOTES
1. Have you been to the ER, urgent care clinic since your last visit? Hospitalized since your last visit? No      2. Have you seen or consulted any other health care providers outside of the [address] [place] [address] since your last visit? Include any pap smears or colon screening. Yes Where: [facility] /Oncology Routine/  PCP Rash    3. Since your last visit, have you had any of the following symptoms?      palpitations, shortness of breath, dizziness and swelling in legs/arms. 4.  Have you had any blood work, X-rays or cardiac testing? Yes Where: [facility]      Requested: NO     In [facility]: NO    5. Where do you normally have your labs drawn? [facility]     6. Do you need any refills today?    Yes

## 2022-05-26 NOTE — PATIENT INSTRUCTIONS
Follow up with Dr. Lora Garcia in 1 year  Follow up with Reema Rincon in 6 months  Stop Metoprolol  Start Carvedilol 25mg twice a day  Check blood pressure 3-5 times a week and record.  Blood pressure goal 140 or less

## 2022-05-26 NOTE — PROGRESS NOTES
Heaven Haines    Chief Complaint   Patient presents with    Follow-up     6 month follow up        HPI    Heaven Haines is a 57 y.o. extremely pleasant patient with history of CAD and myocardial infarctions x2 in the past approximately in 2000 and 2009 who presented for a left heart cath in the setting of ongoing chest and shoulder pain back in September of 2020 (Jeff Rodrigues). I personally obtained and reviewed records his cath had no obstructive coronary disease there was commented mild diffuse disease in the RCA up to 25% in the midportion. She was diagnosed in August 2020 of breast cancer but it does not sound like she underwent mastectomy until after the above cath mention. I do not have all the details at the time of my encounter but she says she has completed chemotherapy still following with her oncologist down there and was able to see she had a normal ejection fraction back in 2019. She has gained weight and she has followed with a cardiologist she says since the early 2000. It is unclear what happened in the early 2000's but she had been maintained on dual antiplatelet therapy for years so finally after her most recent cath last year Plavix was stopped and she was started on high intensity statin. She has no new complaints, can feel CP at rest, palps very sporadic at times. No recent swelling.     Past Medical History:   Diagnosis Date    Anemia     Arthritis     Asthma     Well controlled    Autoimmune disease ([address]     Back pain     Breast cancer ([address] 08/2020    left     Cervical cancer ([address] 11/09/2013    Diabetes ([address]     NIDDM    GERD (gastroesophageal reflux disease)     Heart failure (HCC)     h/o CVA right sided weakness per cardiac note 11/2021 cc    High cholesterol     Hypertension     Lumbar herniated disc     L4/L5, S1/S2    MI (mitral incompetence) 2002    MI (myocardial infarction) ([address] 2000, 2009    MS (multiple sclerosis) ([address]     Right knee pain     Sleep apnea     CPAP    Thromboembolism of deep veins of lower extremity, left ([address] 2002    Unexplained weight gain     Wheelchair dependent 2006    MS       Past Surgical History:   Procedure Laterality Date    COLONOSCOPY N/A 2/7/2022    COLONOSCOPY with Polypectomies performed by Michelle Contreras MD at [address] [place] [address] Street  09/04/2020    Minor blockages    HX HYSTERECTOMY  11/06/2013    HX MASTECTOMY Left 12/07/2020    HX OOPHORECTOMY  11/06/2013       Current Outpatient Medications   Medication Sig Dispense Refill    benazepriL (LOTENSIN) 40 mg tablet Take 1 Tablet by mouth daily. 90 Tablet 3    amLODIPine (Norvasc) 10 mg tablet Take 1 Tablet by mouth daily. 90 Tablet 3    atorvastatin (Lipitor) 40 mg tablet Take 1 Tablet by mouth daily. 90 Tablet 3    aspirin delayed-release 81 mg tablet Take 1 Tablet by mouth daily. 90 Tablet 3    chlorthalidone (HYGROTON) 25 mg tablet Take 1 Tablet by mouth daily. 90 Tablet 3    spironolactone (Aldactone) 50 mg tablet Take 1 Tablet by mouth daily. 90 Tablet 3    potassium chloride SR (Klor-Con 10) 10 mEq tablet Take 1 Tablet by mouth daily. 90 Tablet 2    pregabalin (Lyrica) 100 mg capsule Take 100 mg by mouth two (2) times a day.  diazePAM (VALIUM) 5 mg tablet Take 5 mg by mouth as needed. Pre Procedure      eszopiclone (LUNESTA) 3 mg tablet Take 3 mg by mouth nightly.  ibuprofen (MOTRIN) 800 mg tablet Take 800 mg by mouth every six (6) hours as needed.  promethazine (PHENERGAN) 25 mg tablet Take 25 mg by mouth every six (6) hours as needed.  pregabalin (Lyrica) 200 mg capsule Take 200 mg by mouth nightly.  anastrozole (ARIMIDEX) 1 mg tablet Take 1 mg by mouth daily.  albuterol (PROVENTIL HFA, VENTOLIN HFA, PROAIR HFA) 90 mcg/actuation inhaler Take 1 Puff by inhalation every six (6) hours as needed for Wheezing.       acetaminophen (TYLENOL) 325 mg tablet Take 650 mg by mouth every six (6) hours as needed for Pain.  mineral oil-hydrophil petrolat (Aquaphor) ointment Apply  to affected area as needed for Dry Skin.  beclomethasone (Qvar) 40 mcg/actuation aero Take 1 Puff by inhalation two (2) times a day.  cholecalciferol (VITAMIN D3) (1000 Units /25 mcg) tablet Take 2,000 Units by mouth daily.  dexAMETHasone (Decadron) 4 mg tablet Take 4 mg by mouth every twelve (12) hours.  diclofenac (Voltaren) 1 % gel Apply 2 g to affected area four (4) times daily.  folic acid (FOLVITE) 1 mg tablet Take 1 mg by mouth daily.  interferon beta-1a (AVONEX) 30 mcg/0.5 mL injection 30 mcg by IntraMUSCular route every seven (7) days.  melatonin 3 mg tablet Take 3 mg by mouth nightly as needed for Insomnia.  meloxicam (MOBIC) 7.5 mg tablet Take 7.5 mg by mouth daily as needed.  metFORMIN (GLUCOPHAGE) 500 mg tablet Take 500 mg by mouth two (2) times daily (with meals).  mometasone (Asmanex Twisthaler) 220 mcg/ actuation (14) inhaler Take 1 Puff by inhalation daily.  omeprazole (PriLOSEC OTC) 20 mg tablet Take 20 mg by mouth daily.  ondansetron hcl (ZOFRAN) 8 mg tablet Take 8 mg by mouth every eight (8) hours as needed for Nausea or Vomiting.  prochlorperazine (Compazine) 5 mg tablet Take 5 mg by mouth every six (6) hours as needed for Nausea or Vomiting.  trospium (SANCTURA) 20 mg tablet Take 20 mg by mouth Before breakfast, lunch, and dinner.          Allergies   Allergen Reactions    Penicillin G Hives and Rash       Social History     Socioeconomic History    Marital status:      Spouse name: Not on file    Number of children: Not on file    Years of education: Not on file    Highest education level: Not on file   Occupational History    Not on file   Tobacco Use    Smoking status: Never Smoker    Smokeless tobacco: Never Used   Vaping Use    Vaping Use: Never used   Substance and Sexual Activity    Alcohol use: Never    Drug use: Never    Sexual activity: Not on file   Other Topics Concern    Not on file   Social History Narrative    Not on file     Social Determinants of Health     Financial Resource Strain:     Difficulty of Paying Living Expenses: Not on file   Food Insecurity:     Worried About Running Out of Food in the Last Year: Not on file    Amie of Food in the Last Year: Not on file   Transportation Needs:     Lack of Transportation (Medical): Not on file    Lack of Transportation (Non-Medical): Not on file   Physical Activity:     Days of Exercise per Week: Not on file    Minutes of Exercise per Session: Not on file   Stress:     Feeling of Stress : Not on file   Social Connections:     Frequency of Communication with Friends and Family: Not on file    Frequency of Social Gatherings with Friends and Family: Not on file    Attends Sabianism Services: Not on file    Active Member of [address] [place] [address] [employer] or Organizations: Not on file    Attends Club or Organization Meetings: Not on file    Marital Status: Not on file   Intimate Partner Violence:     Fear of Current or Ex-Partner: Not on file    Emotionally Abused: Not on file    Physically Abused: Not on file    Sexually Abused: Not on file   Housing Stability:     Unable to Pay for Housing in the Last Year: Not on file    Number of [place] in the Last Year: Not on file    Unstable Housing in the Last Year: Not on file    just moved to [place] where  is from    [facility]: Details are unknown at the time of the encounter but patient says her mother  of a heart attack at age [de-identified], she is got 5 brothers and 2 of them have \"heart issues\" and another brother who  of CVA at 48    Review of Systems    15 pt Review of Systems is negative unless otherwise mentioned in the HPI.     Wt Readings from Last 3 Encounters:   22 105.7 kg (233 lb)   22 104.3 kg (230 lb)   22 102.9 kg (226 lb 12.8 oz)     Temp Readings from Last 3 Encounters:   22 97.8 °F (36.6 °C)   02/07/22 98 °F (36.7 °C)   01/18/22 97.9 °F (36.6 °C)     BP Readings from Last 3 Encounters:   05/26/22 (!) 168/96   02/07/22 (!) 162/98   01/18/22 (!) 178/105     Pulse Readings from Last 3 Encounters:   05/26/22 71   02/07/22 94   01/18/22 77       Physical Exam:    Visit Vitals  BP (!) 168/96 (BP 1 Location: Right upper arm, BP Patient Position: Sitting, BP Cuff Size: Large adult)   Pulse 71   Ht 4' 11\" (1.499 m)   Wt 105.7 kg (233 lb)   SpO2 98%   BMI 47.06 kg/m²      Physical Exam  HENT:      Head: Normocephalic and atraumatic. Eyes:      Pupils: Pupils are equal, round, and reactive to light. Cardiovascular:      Rate and Rhythm: Normal rate and regular rhythm. Heart sounds: Normal heart sounds. No murmur heard. No friction rub. No gallop. Pulmonary:      Effort: Pulmonary effort is normal. No respiratory distress. Breath sounds: Normal breath sounds. No wheezing or rales. Chest:      Chest wall: No tenderness. Abdominal:      General: Bowel sounds are normal.      Palpations: Abdomen is soft. Musculoskeletal:         General: No tenderness. Skin:     General: Skin is warm and dry. Neurological:      Mental Status: She is alert and oriented to person, place, and time.        EKG today shows: NSR, normal axis and intervals, no ST segment abnormalities    Impression and Plan:  David Underwood is a 57 y.o. 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  MS  HTN  DM2  Breast CA s/p chemo/ radiation/ mastectomy    CP is atypical and chronic and has been same since after [facility]  I suspect its post mastectomy, as its at rest and she believes present since  Her BP is elevated above goal and I suspect it is going up later in the day as she takes all her BP meds at night   Will change Toprol to Coreg 25 mg BID and see if that's enough to get BP at goal  Please log BP and bring to next appt with NP in 6 months  Me yearly    Thank you for allowing me to participate in the care of your patient, please do not hesitate to call with questions or concerns.     [address],    Pradip Dunne, DO

## 2022-06-02 ENCOUNTER — APPOINTMENT (OUTPATIENT)
Dept: CT IMAGING | Age: 62
End: 2022-06-02
Attending: STUDENT IN AN ORGANIZED HEALTH CARE EDUCATION/TRAINING PROGRAM
Payer: MEDICARE

## 2022-06-02 ENCOUNTER — HOSPITAL ENCOUNTER (EMERGENCY)
Age: 62
Discharge: HOME | End: 2022-06-03
Attending: STUDENT IN AN ORGANIZED HEALTH CARE EDUCATION/TRAINING PROGRAM
Payer: MEDICARE

## 2022-06-02 DIAGNOSIS — R51.9 NONINTRACTABLE HEADACHE, UNSPECIFIED CHRONICITY PATTERN, UNSPECIFIED HEADACHE TYPE: Primary | ICD-10-CM

## 2022-06-02 LAB
BASOPHILS # BLD: 0 K/UL (ref 0–0.1)
BASOPHILS NFR BLD: 0 % (ref 0–2)
DIFFERENTIAL METHOD BLD: ABNORMAL
EOSINOPHIL # BLD: 0 K/UL (ref 0–0.4)
EOSINOPHIL NFR BLD: 0 % (ref 0–5)
ERYTHROCYTE [DISTWIDTH] IN BLOOD BY AUTOMATED COUNT: 12.9 % (ref 11.6–14.5)
HCT VFR BLD AUTO: 38.5 % (ref 35–45)
HGB BLD-MCNC: 12.6 G/DL (ref 12–16)
IMM GRANULOCYTES # BLD AUTO: 0.1 K/UL (ref 0–0.04)
IMM GRANULOCYTES NFR BLD AUTO: 1 % (ref 0–0.5)
LYMPHOCYTES # BLD: 0.4 K/UL (ref 0.9–3.6)
LYMPHOCYTES NFR BLD: 3 % (ref 21–52)
MCH RBC QN AUTO: 27.6 PG (ref 24–34)
MCHC RBC AUTO-ENTMCNC: 32.7 G/DL (ref 31–37)
MCV RBC AUTO: 84.2 FL (ref 78–100)
MONOCYTES # BLD: 1 K/UL (ref 0.05–1.2)
MONOCYTES NFR BLD: 8 % (ref 3–10)
NEUTS SEG # BLD: 11.2 K/UL (ref 1.8–8)
NEUTS SEG NFR BLD: 89 % (ref 40–73)
NRBC # BLD: 0 K/UL (ref 0–0.01)
NRBC BLD-RTO: 0 PER 100 WBC
PLATELET # BLD AUTO: 199 K/UL (ref 135–420)
PMV BLD AUTO: 9.4 FL (ref 9.2–11.8)
RBC # BLD AUTO: 4.57 M/UL (ref 4.2–5.3)
WBC # BLD AUTO: 12.6 K/UL (ref 4.6–13.2)

## 2022-06-02 PROCEDURE — 80053 COMPREHEN METABOLIC PANEL: CPT

## 2022-06-02 PROCEDURE — 96374 THER/PROPH/DIAG INJ IV PUSH: CPT

## 2022-06-02 PROCEDURE — 70450 CT HEAD/BRAIN W/O DYE: CPT

## 2022-06-02 PROCEDURE — 93005 ELECTROCARDIOGRAM TRACING: CPT

## 2022-06-02 PROCEDURE — 96361 HYDRATE IV INFUSION ADD-ON: CPT

## 2022-06-02 PROCEDURE — 74011250636 HC RX REV CODE- 250/636: Performed by: STUDENT IN AN ORGANIZED HEALTH CARE EDUCATION/TRAINING PROGRAM

## 2022-06-02 PROCEDURE — 83690 ASSAY OF LIPASE: CPT

## 2022-06-02 PROCEDURE — 99284 EMERGENCY DEPT VISIT MOD MDM: CPT

## 2022-06-02 PROCEDURE — 85025 COMPLETE CBC W/AUTO DIFF WBC: CPT

## 2022-06-02 RX ADMIN — SODIUM CHLORIDE 1000 ML: 900 INJECTION, SOLUTION INTRAVENOUS at 23:50

## 2022-06-03 VITALS
TEMPERATURE: 98.8 F | RESPIRATION RATE: 18 BRPM | DIASTOLIC BLOOD PRESSURE: 93 MMHG | HEIGHT: 59 IN | HEART RATE: 95 BPM | WEIGHT: 233 LBS | BODY MASS INDEX: 46.97 KG/M2 | SYSTOLIC BLOOD PRESSURE: 171 MMHG | OXYGEN SATURATION: 98 %

## 2022-06-03 LAB
ALBUMIN SERPL-MCNC: 3.3 G/DL (ref 3.4–5)
ALBUMIN/GLOB SERPL: 0.8 {RATIO} (ref 0.8–1.7)
ALP SERPL-CCNC: 105 U/L (ref 45–117)
ALT SERPL-CCNC: 19 U/L (ref 13–56)
ANION GAP SERPL CALC-SCNC: 11 MMOL/L (ref 3–18)
AST SERPL-CCNC: 15 U/L (ref 10–38)
ATRIAL RATE: 79 BPM
BILIRUB SERPL-MCNC: 0.7 MG/DL (ref 0.2–1)
BUN SERPL-MCNC: 15 MG/DL (ref 7–18)
BUN/CREAT SERPL: 22 (ref 12–20)
CALCIUM SERPL-MCNC: 8.7 MG/DL (ref 8.5–10.1)
CALCULATED P AXIS, ECG09: 40 DEGREES
CALCULATED R AXIS, ECG10: -10 DEGREES
CALCULATED T AXIS, ECG11: 26 DEGREES
CHLORIDE SERPL-SCNC: 102 MMOL/L (ref 100–111)
CO2 SERPL-SCNC: 26 MMOL/L (ref 21–32)
CREAT SERPL-MCNC: 0.67 MG/DL (ref 0.6–1.3)
DIAGNOSIS, 93000: NORMAL
GLOBULIN SER CALC-MCNC: 4 G/DL (ref 2–4)
GLUCOSE SERPL-MCNC: 208 MG/DL (ref 74–99)
LIPASE SERPL-CCNC: 46 U/L (ref 73–393)
P-R INTERVAL, ECG05: 162 MS
POTASSIUM SERPL-SCNC: 3.3 MMOL/L (ref 3.5–5.5)
PROT SERPL-MCNC: 7.3 G/DL (ref 6.4–8.2)
Q-T INTERVAL, ECG07: 374 MS
QRS DURATION, ECG06: 88 MS
QTC CALCULATION (BEZET), ECG08: 428 MS
SODIUM SERPL-SCNC: 139 MMOL/L (ref 136–145)
VENTRICULAR RATE, ECG03: 79 BPM

## 2022-06-03 PROCEDURE — 74011250637 HC RX REV CODE- 250/637: Performed by: STUDENT IN AN ORGANIZED HEALTH CARE EDUCATION/TRAINING PROGRAM

## 2022-06-03 PROCEDURE — 74011250636 HC RX REV CODE- 250/636: Performed by: STUDENT IN AN ORGANIZED HEALTH CARE EDUCATION/TRAINING PROGRAM

## 2022-06-03 RX ORDER — BUTALBITAL, ACETAMINOPHEN AND CAFFEINE 300; 40; 50 MG/1; MG/1; MG/1
1 CAPSULE ORAL
Qty: 12 CAPSULE | Refills: 0 | Status: SHIPPED | OUTPATIENT
Start: 2022-06-03 | End: 2022-06-06

## 2022-06-03 RX ORDER — METOCLOPRAMIDE HYDROCHLORIDE 5 MG/ML
5 INJECTION INTRAMUSCULAR; INTRAVENOUS EVERY 6 HOURS
Status: DISCONTINUED | OUTPATIENT
Start: 2022-06-03 | End: 2022-06-03 | Stop reason: HOSPADM

## 2022-06-03 RX ORDER — BUTALBITAL, ACETAMINOPHEN AND CAFFEINE 300; 40; 50 MG/1; MG/1; MG/1
1 CAPSULE ORAL
Status: DISCONTINUED | OUTPATIENT
Start: 2022-06-03 | End: 2022-06-03 | Stop reason: HOSPADM

## 2022-06-03 RX ORDER — POTASSIUM CHLORIDE 20 MEQ/1
40 TABLET, EXTENDED RELEASE ORAL
Status: COMPLETED | OUTPATIENT
Start: 2022-06-03 | End: 2022-06-03

## 2022-06-03 RX ADMIN — POTASSIUM CHLORIDE 40 MEQ: 1500 TABLET, EXTENDED RELEASE ORAL at 02:11

## 2022-06-03 RX ADMIN — METOCLOPRAMIDE HYDROCHLORIDE 5 MG: 5 INJECTION INTRAMUSCULAR; INTRAVENOUS at 00:34

## 2022-06-03 RX ADMIN — BUTALBITA,ACETAMINOPHEN AND CAFFEINE 1 CAPSULE: 50; 300; 40 CAPSULE ORAL at 01:03

## 2022-06-03 NOTE — ED NOTES
I have reviewed discharge instructions with the patient. The patient verbalized understanding. Patient looks comfortable, left ED in stable condition. Patient wheeled to family vehicle, no acute distress noted.

## 2022-06-03 NOTE — ED PROVIDER NOTES
EMERGENCY DEPARTMENT HISTORY AND PHYSICAL EXAM    11:59 PM      Date: 6/2/2022  Patient Name: Jennifer Gatica    History of Presenting Illness     Chief Complaint   Patient presents with    Headache    Palpitations    Vomiting         History Provided By: Patient  Location/Duration/Severity/Modifying factors   Patient history as below presented due to headache associated with myalgias, nausea and vomiting for the last 3 days. Patient states that the headache is sharp on the left portion of her head. Patient states that the symptoms gradually came on slowly got worse. Patient had some episodes of nausea and vomiting tonight so presented emergency department. Patient states that she has not had any MS flares in years and do not believe that this is 1 now. PCP: Isaias Tsang DO    Current Facility-Administered Medications   Medication Dose Route Frequency Provider Last Rate Last Admin    sodium chloride 0.9 % bolus infusion 1,000 mL  1,000 mL IntraVENous ONCE Clara Cerrato MD         Current Outpatient Medications   Medication Sig Dispense Refill    benazepriL (LOTENSIN) 40 mg tablet Take 1 Tablet by mouth daily. 90 Tablet 3    amLODIPine (Norvasc) 10 mg tablet Take 1 Tablet by mouth daily. 90 Tablet 3    atorvastatin (Lipitor) 40 mg tablet Take 1 Tablet by mouth daily. 90 Tablet 3    aspirin delayed-release 81 mg tablet Take 1 Tablet by mouth daily. 90 Tablet 3    chlorthalidone (HYGROTON) 25 mg tablet Take 1 Tablet by mouth daily. 90 Tablet 3    spironolactone (Aldactone) 50 mg tablet Take 1 Tablet by mouth daily. 90 Tablet 3    potassium chloride SR (Klor-Con 10) 10 mEq tablet Take 1 Tablet by mouth daily. 90 Tablet 2    carvediloL (COREG) 25 mg tablet Take 1 Tablet by mouth two (2) times daily (with meals). 60 Tablet 6    pregabalin (Lyrica) 100 mg capsule Take 100 mg by mouth two (2) times a day.  diazePAM (VALIUM) 5 mg tablet Take 5 mg by mouth as needed.  Pre Procedure  eszopiclone (LUNESTA) 3 mg tablet Take 3 mg by mouth nightly.  ibuprofen (MOTRIN) 800 mg tablet Take 800 mg by mouth every six (6) hours as needed.  promethazine (PHENERGAN) 25 mg tablet Take 25 mg by mouth every six (6) hours as needed.  pregabalin (Lyrica) 200 mg capsule Take 200 mg by mouth nightly.  anastrozole (ARIMIDEX) 1 mg tablet Take 1 mg by mouth daily.  albuterol (PROVENTIL HFA, VENTOLIN HFA, PROAIR HFA) 90 mcg/actuation inhaler Take 1 Puff by inhalation every six (6) hours as needed for Wheezing.  acetaminophen (TYLENOL) 325 mg tablet Take 650 mg by mouth every six (6) hours as needed for Pain.  mineral oil-hydrophil petrolat (Aquaphor) ointment Apply  to affected area as needed for Dry Skin.  beclomethasone (Qvar) 40 mcg/actuation aero Take 1 Puff by inhalation two (2) times a day.  cholecalciferol (VITAMIN D3) (1000 Units /25 mcg) tablet Take 2,000 Units by mouth daily.  dexAMETHasone (Decadron) 4 mg tablet Take 4 mg by mouth every twelve (12) hours.  diclofenac (Voltaren) 1 % gel Apply 2 g to affected area four (4) times daily.  folic acid (FOLVITE) 1 mg tablet Take 1 mg by mouth daily.  interferon beta-1a (AVONEX) 30 mcg/0.5 mL injection 30 mcg by IntraMUSCular route every seven (7) days.  melatonin 3 mg tablet Take 3 mg by mouth nightly as needed for Insomnia.  meloxicam (MOBIC) 7.5 mg tablet Take 7.5 mg by mouth daily as needed.  metFORMIN (GLUCOPHAGE) 500 mg tablet Take 500 mg by mouth two (2) times daily (with meals).  mometasone (Asmanex Twisthaler) 220 mcg/ actuation (14) inhaler Take 1 Puff by inhalation daily.  omeprazole (PriLOSEC OTC) 20 mg tablet Take 20 mg by mouth daily.  ondansetron hcl (ZOFRAN) 8 mg tablet Take 8 mg by mouth every eight (8) hours as needed for Nausea or Vomiting.       prochlorperazine (Compazine) 5 mg tablet Take 5 mg by mouth every six (6) hours as needed for Nausea or Vomiting.  trospium (SANCTURA) 20 mg tablet Take 20 mg by mouth Before breakfast, lunch, and dinner. Past History     Past Medical History:  Past Medical History:   Diagnosis Date    Anemia     Arthritis     Asthma     Well controlled    Autoimmune disease ([facility] [address]     Back pain     Breast cancer ([facility] [address] 08/2020    left     Cervical cancer ([facility] [address] 11/09/2013    Diabetes (HCC)     NIDDM    GERD (gastroesophageal reflux disease)     Heart failure (HCC)     h/o CVA right sided weakness per cardiac note 11/2021 cc    High cholesterol     Hypertension     Lumbar herniated disc     L4/L5, S1/S2    MI (mitral incompetence) 2002    MI (myocardial infarction) ([facility] [address] 2000, 2009    MS (multiple sclerosis) ([facility] [address]     Right knee pain     Sleep apnea     CPAP    Thromboembolism of deep veins of lower extremity, left ([facility] [address] 2002    Unexplained weight gain     Wheelchair dependent 2006    MS       Past Surgical History:  Past Surgical History:   Procedure Laterality Date    COLONOSCOPY N/A 2/7/2022    COLONOSCOPY with Polypectomies performed by Chauncy Barthel, MD at [address] [place] [address]  09/04/2020    Minor blockages    HX HYSTERECTOMY  11/06/2013    HX MASTECTOMY Left 12/07/2020    HX OOPHORECTOMY  11/06/2013       Family History:  No family history on file. Social History:  Social History     Tobacco Use    Smoking status: Never Smoker    Smokeless tobacco: Never Used   Vaping Use    Vaping Use: Never used   Substance Use Topics    Alcohol use: Never    Drug use: Never       Allergies: Allergies   Allergen Reactions    Penicillin G Hives and Rash         Review of Systems       Review of Systems   Constitutional: Negative for activity change and fever. Respiratory: Negative for chest tightness and shortness of breath. Cardiovascular: Negative for chest pain. Gastrointestinal: Positive for nausea and vomiting.    Genitourinary: Negative for difficulty urinating and dysuria. Skin: Negative. Neurological: Positive for headaches. Physical Exam     Visit Vitals  BP (!) 184/84 (BP 1 Location: Left upper arm, BP Patient Position: At rest)   Pulse 95   Temp 98.8 °F (37.1 °C)   Resp 18   Ht 4' 11\" (1.499 m)   Wt 105.7 kg (233 lb)   SpO2 95%   BMI 47.06 kg/m²         Physical Exam  Vitals and nursing note reviewed. Constitutional:       General: She is not in acute distress. Appearance: She is well-developed. HENT:      Head: Normocephalic and atraumatic. Right Ear: External ear normal.      Left Ear: External ear normal.      Nose: Nose normal.   Eyes:      Extraocular Movements: Extraocular movements intact. Neck:      Thyroid: No thyromegaly. Vascular: No JVD. Trachea: No tracheal deviation. Cardiovascular:      Rate and Rhythm: Normal rate and regular rhythm. Heart sounds: Normal heart sounds. No murmur heard. No gallop. Pulmonary:      Effort: Pulmonary effort is normal.      Breath sounds: Normal breath sounds. Abdominal:      General: Bowel sounds are normal. There is no distension. Palpations: Abdomen is soft. Tenderness: There is no abdominal tenderness. There is no guarding. Musculoskeletal:         General: No tenderness. Normal range of motion. Cervical back: Normal range of motion and neck supple. Skin:     General: Skin is warm and dry. Neurological:      General: No focal deficit present. Mental Status: She is alert and oriented to person, place, and time. Sensory: No sensory deficit. Motor: No weakness. Psychiatric:         Behavior: Behavior normal.         Thought Content:  Thought content normal.         Judgment: Judgment normal.           Diagnostic Study Results     Labs -  Recent Results (from the past 12 hour(s))   EKG, 12 LEAD, INITIAL    Collection Time: 06/02/22 11:34 PM   Result Value Ref Range    Ventricular Rate 79 BPM    Atrial Rate 79 BPM    P-R Interval 162 ms QRS Duration 88 ms    Q-T Interval 374 ms    QTC Calculation (Bezet) 428 ms    Calculated P Axis 40 degrees    Calculated R Axis -10 degrees    Calculated T Axis 26 degrees    Diagnosis       Normal sinus rhythm  Possible Left atrial enlargement  Left ventricular hypertrophy  Nonspecific T wave abnormality  Abnormal ECG  No previous ECGs available     CBC WITH AUTOMATED DIFF    Collection Time: 06/02/22 11:35 PM   Result Value Ref Range    WBC 12.6 4.6 - 13.2 K/uL    RBC 4.57 4.20 - 5.30 M/uL    HGB 12.6 12.0 - 16.0 g/dL    HCT 38.5 35.0 - 45.0 %    MCV 84.2 78.0 - 100.0 FL    MCH 27.6 24.0 - 34.0 PG    MCHC 32.7 31.0 - 37.0 g/dL    RDW 12.9 11.6 - 14.5 %    PLATELET 621 352 - 605 K/uL    MPV 9.4 9.2 - 11.8 FL    NRBC 0.0 0  WBC    ABSOLUTE NRBC 0.00 0.00 - 0.01 K/uL    NEUTROPHILS 89 (H) 40 - 73 %    LYMPHOCYTES 3 (L) 21 - 52 %    MONOCYTES 8 3 - 10 %    EOSINOPHILS 0 0 - 5 %    BASOPHILS 0 0 - 2 %    IMMATURE GRANULOCYTES 1 (H) 0.0 - 0.5 %    ABS. NEUTROPHILS 11.2 (H) 1.8 - 8.0 K/UL    ABS. LYMPHOCYTES 0.4 (L) 0.9 - 3.6 K/UL    ABS. MONOCYTES 1.0 0.05 - 1.2 K/UL    ABS. EOSINOPHILS 0.0 0.0 - 0.4 K/UL    ABS. BASOPHILS 0.0 0.0 - 0.1 K/UL    ABS. IMM. GRANS. 0.1 (H) 0.00 - 0.04 K/UL    DF AUTOMATED         Radiologic Studies -   CT HEAD WO CONT    (Results Pending)         Medical Decision Making   I am the first provider for this patient. I reviewed the vital signs, available nursing notes, past medical history, past surgical history, family history and social history. Vital Signs-Reviewed the patient's vital signs. EKG:     Records Reviewed: Nursing Notes (Time of Review: 11:59 PM)    ED Course: Progress Notes, Reevaluation, and Consults:    ED Course as of 06/03/22 0402   Fri Jun 03, 2022   0200 Patient reevaluated states that headache is resolved with IV fluids Reglan and Fioricet. We will plan to write for a few days of Fioricet for patient to take if she has return of headaches.   Patient that she should discuss possible risks of migraines with her primary care doctor for further treatment. [TB]      ED Course User Index  [TB] Shelby Cedillo MD       Provider Notes (Medical Decision Making):   MDM  Number of Diagnoses or Management Options  Nonintractable headache, unspecified chronicity pattern, unspecified headache type  Diagnosis management comments: Patient history as below presented due to headache associated with myalgias, nausea and vomiting for the last 3 days. Differential includes headaches/migraines, viral gastritis, intracranial process such as mass or bleed, viral illness. Plan to assess with CBC CMP CT head. Plan to treat patient's symptoms with IV fluids, Reglan, Fioricet. Patient's labs were unremarkable except for slightly low potassium which was repleted with oral potassium. CT head without any acute findings. Patient's symptoms resolved with fluids, Reglan and Fioricet. Plan to discharge patient with a few doses of Fioricet if she has rebound headache. Procedures    Critical Care Time:       Diagnosis     Clinical Impression: No diagnosis found. Disposition: Home    Follow-up Information    None          Patient's Medications   Start Taking    No medications on file   Continue Taking    ACETAMINOPHEN (TYLENOL) 325 MG TABLET    Take 650 mg by mouth every six (6) hours as needed for Pain. ALBUTEROL (PROVENTIL HFA, VENTOLIN HFA, PROAIR HFA) 90 MCG/ACTUATION INHALER    Take 1 Puff by inhalation every six (6) hours as needed for Wheezing. AMLODIPINE (NORVASC) 10 MG TABLET    Take 1 Tablet by mouth daily. ANASTROZOLE (ARIMIDEX) 1 MG TABLET    Take 1 mg by mouth daily. ASPIRIN DELAYED-RELEASE 81 MG TABLET    Take 1 Tablet by mouth daily. ATORVASTATIN (LIPITOR) 40 MG TABLET    Take 1 Tablet by mouth daily. BECLOMETHASONE (QVAR) 40 MCG/ACTUATION AERO    Take 1 Puff by inhalation two (2) times a day.     BENAZEPRIL (LOTENSIN) 40 MG TABLET    Take 1 Tablet by mouth daily. CARVEDILOL (COREG) 25 MG TABLET    Take 1 Tablet by mouth two (2) times daily (with meals). CHLORTHALIDONE (HYGROTON) 25 MG TABLET    Take 1 Tablet by mouth daily. CHOLECALCIFEROL (VITAMIN D3) (1000 UNITS /25 MCG) TABLET    Take 2,000 Units by mouth daily. DEXAMETHASONE (DECADRON) 4 MG TABLET    Take 4 mg by mouth every twelve (12) hours. DIAZEPAM (VALIUM) 5 MG TABLET    Take 5 mg by mouth as needed. Pre Procedure    DICLOFENAC (VOLTAREN) 1 % GEL    Apply 2 g to affected area four (4) times daily. ESZOPICLONE (LUNESTA) 3 MG TABLET    Take 3 mg by mouth nightly. FOLIC ACID (FOLVITE) 1 MG TABLET    Take 1 mg by mouth daily. IBUPROFEN (MOTRIN) 800 MG TABLET    Take 800 mg by mouth every six (6) hours as needed. INTERFERON BETA-1A (AVONEX) 30 MCG/0.5 ML INJECTION    30 mcg by IntraMUSCular route every seven (7) days. MELATONIN 3 MG TABLET    Take 3 mg by mouth nightly as needed for Insomnia. MELOXICAM (MOBIC) 7.5 MG TABLET    Take 7.5 mg by mouth daily as needed. METFORMIN (GLUCOPHAGE) 500 MG TABLET    Take 500 mg by mouth two (2) times daily (with meals). MINERAL OIL-HYDROPHIL PETROLAT (AQUAPHOR) OINTMENT    Apply  to affected area as needed for Dry Skin. MOMETASONE (ASMANEX TWISTHALER) 220 MCG/ ACTUATION (14) INHALER    Take 1 Puff by inhalation daily. OMEPRAZOLE (PRILOSEC OTC) 20 MG TABLET    Take 20 mg by mouth daily. ONDANSETRON HCL (ZOFRAN) 8 MG TABLET    Take 8 mg by mouth every eight (8) hours as needed for Nausea or Vomiting. POTASSIUM CHLORIDE SR (KLOR-CON 10) 10 MEQ TABLET    Take 1 Tablet by mouth daily. PREGABALIN (LYRICA) 100 MG CAPSULE    Take 100 mg by mouth two (2) times a day. PREGABALIN (LYRICA) 200 MG CAPSULE    Take 200 mg by mouth nightly. PROCHLORPERAZINE (COMPAZINE) 5 MG TABLET    Take 5 mg by mouth every six (6) hours as needed for Nausea or Vomiting.     PROMETHAZINE (PHENERGAN) 25 MG TABLET    Take 25 mg by mouth every six (6) hours as needed. SPIRONOLACTONE (ALDACTONE) 50 MG TABLET    Take 1 Tablet by mouth daily. TROSPIUM (SANCTURA) 20 MG TABLET    Take 20 mg by mouth Before breakfast, lunch, and dinner. These Medications have changed    No medications on file   Stop Taking    No medications on file     Disclaimer: Sections of this note are dictated using utilizing voice recognition software. Minor typographical errors may be present. If questions arise, please do not hesitate to contact me or call our department.

## 2022-06-03 NOTE — ED TRIAGE NOTES
Patient A/O x 4, brought into ED by [facility] with complaint of left sided headache with blurred vision to left eye, vomiting, palpitations x 4 days. Patient denies chest pain, SOB, abdominal pain.

## 2022-06-16 ENCOUNTER — TRANSCRIBE ORDER (OUTPATIENT)
Dept: SCHEDULING | Age: 62
End: 2022-06-16

## 2022-06-16 DIAGNOSIS — G35 MULTIPLE SCLEROSIS (HCC): Primary | ICD-10-CM

## 2022-07-01 ENCOUNTER — HOSPITAL ENCOUNTER (OUTPATIENT)
Age: 62
Discharge: HOME | End: 2022-07-01
Attending: FAMILY MEDICINE
Payer: MEDICARE

## 2022-07-01 DIAGNOSIS — G35 MULTIPLE SCLEROSIS (HCC): ICD-10-CM

## 2022-07-01 PROCEDURE — 70551 MRI BRAIN STEM W/O DYE: CPT

## 2022-09-21 ENCOUNTER — OFFICE VISIT (OUTPATIENT)
Dept: ORTHOPEDIC SURGERY | Age: 62
End: 2022-09-21
Payer: MEDICARE

## 2022-09-21 DIAGNOSIS — M25.561 CHRONIC PAIN OF RIGHT KNEE: ICD-10-CM

## 2022-09-21 DIAGNOSIS — G89.29 CHRONIC PAIN OF RIGHT KNEE: ICD-10-CM

## 2022-09-21 DIAGNOSIS — M17.11 PRIMARY OSTEOARTHRITIS OF RIGHT KNEE: ICD-10-CM

## 2022-09-21 DIAGNOSIS — M25.562 CHRONIC PAIN OF LEFT KNEE: ICD-10-CM

## 2022-09-21 DIAGNOSIS — M17.12 PRIMARY OSTEOARTHRITIS OF LEFT KNEE: Primary | ICD-10-CM

## 2022-09-21 DIAGNOSIS — G89.29 CHRONIC PAIN OF LEFT KNEE: ICD-10-CM

## 2022-09-21 PROCEDURE — 99214 OFFICE O/P EST MOD 30 MIN: CPT | Performed by: SPECIALIST

## 2022-09-21 PROCEDURE — G8756 NO BP MEASURE DOC: HCPCS | Performed by: SPECIALIST

## 2022-09-21 PROCEDURE — 20610 DRAIN/INJ JOINT/BURSA W/O US: CPT | Performed by: SPECIALIST

## 2022-09-21 PROCEDURE — 3017F COLORECTAL CA SCREEN DOC REV: CPT | Performed by: SPECIALIST

## 2022-09-21 PROCEDURE — G8427 DOCREV CUR MEDS BY ELIG CLIN: HCPCS | Performed by: SPECIALIST

## 2022-09-21 PROCEDURE — G8417 CALC BMI ABV UP PARAM F/U: HCPCS | Performed by: SPECIALIST

## 2022-09-21 PROCEDURE — G8432 DEP SCR NOT DOC, RNG: HCPCS | Performed by: SPECIALIST

## 2022-09-21 RX ORDER — BETAMETHASONE SODIUM PHOSPHATE AND BETAMETHASONE ACETATE 3; 3 MG/ML; MG/ML
6 INJECTION, SUSPENSION INTRA-ARTICULAR; INTRALESIONAL; INTRAMUSCULAR; SOFT TISSUE ONCE
Status: COMPLETED | OUTPATIENT
Start: 2022-09-21 | End: 2022-09-21

## 2022-09-21 RX ADMIN — BETAMETHASONE SODIUM PHOSPHATE AND BETAMETHASONE ACETATE 6 MG: 3; 3 INJECTION, SUSPENSION INTRA-ARTICULAR; INTRALESIONAL; INTRAMUSCULAR; SOFT TISSUE at 09:21

## 2022-09-21 NOTE — PROGRESS NOTES
Patient: Sonu Pineda                MRN: [id number]       SSN: [id number]  YOB: 1960        AGE: 60 y.o. SEX: female    PCP: Carin Hdz DO  09/21/22    CC: RIGHT KNEE PAIN    HISTORY:  Sonu Pineda is a 60 y.o. female who is seen for bilateral knee pain. She has been experiencing bilateral knee pain for the past several years. She does not recall any injury. She feels pain with standing, walking. Her ambulation is quite limited due to her severe knee arthritis. She experiences  startup pain after sitting. She has been followed in the past for knee pain by Dr. Vero Burr. She now presents for a second orthopedic opinion. She has a h/o breast cancer and is s/p mastectomy. Pain Assessment  9/21/2022   Location of Pain Knee   Location Modifiers Right   Severity of Pain 9   Quality of Pain Sharp; Other (Comment)   Quality of Pain Comment Shooting   Duration of Pain Persistent   Frequency of Pain Constant   Aggravating Factors Exercise;Straightening;Stretching;Bending   Aggravating Factors Comment -   Limiting Behavior Yes   Relieving Factors Nothing   Relieving Factors Comment -   Result of Injury No     Occupation, etc:  Ms. Ashok Donahue is retired on [employer] [address]. She was a  for [employer] in [place]. She is not very active. She is a minimal household ambulator. She lives in [place] with her  and 15year old grandson over whom she has guardianship. She has one son. Ms. Ashok Donahue weighs 233 lbs and is 4'11\" tall. No results found for: HBA1C, TVJ9CPVH, PBR5TPMQ, XDI6HDWC  Weight Metrics 6/2/2022 5/26/2022 3/31/2022 3/11/2022 2/7/2022 12/13/2021 11/12/2021   Weight 233 lb 233 lb - 230 lb 226 lb 12.8 oz 241 lb 235 lb   BMI 47.06 kg/m2 47.06 kg/m2 46.45 kg/m2 46.45 kg/m2 45.81 kg/m2 48.68 kg/m2 46.28 kg/m2       There is no problem list on file for this patient.     REVIEW OF SYSTEMS:    Constitutional Symptoms: Negative   Eyes: Negative   Ears, Nose, Throat and Mouth: Negative   Cardiovascular: Negative   Respiratory: Negative   Genitourinary: Per HPI   Gastrointestinal: Per HPI   Integumentary (Skin and/or Breast): Negative   Musculoskeletal: Per HPI   Endocrine/Rheumatologic: Negative   Neurological: Per HPI   Hematology/Lymphatic: Negative    Allergic/Immunologic: Negative   Phychiatric: Negative    Social History     Socioeconomic History    Marital status:      Spouse name: Not on file    Number of children: Not on file    Years of education: Not on file    Highest education level: Not on file   Occupational History    Not on file   Tobacco Use    Smoking status: Never    Smokeless tobacco: Never   Vaping Use    Vaping Use: Never used   Substance and Sexual Activity    Alcohol use: Never    Drug use: Never    Sexual activity: Not on file   Other Topics Concern    Not on file   Social History Narrative    Not on file     Social Determinants of Health     Financial Resource Strain: Not on file   Food Insecurity: Not on file   Transportation Needs: Not on file   Physical Activity: Not on file   Stress: Not on file   Social Connections: Not on file   Intimate Partner Violence: Not on file   Housing Stability: Not on file      Allergies   Allergen Reactions    Penicillin G Hives and Rash      Current Outpatient Medications   Medication Sig    benazepriL (LOTENSIN) 40 mg tablet Take 1 Tablet by mouth daily. amLODIPine (Norvasc) 10 mg tablet Take 1 Tablet by mouth daily. atorvastatin (Lipitor) 40 mg tablet Take 1 Tablet by mouth daily. aspirin delayed-release 81 mg tablet Take 1 Tablet by mouth daily. chlorthalidone (HYGROTON) 25 mg tablet Take 1 Tablet by mouth daily. spironolactone (Aldactone) 50 mg tablet Take 1 Tablet by mouth daily. potassium chloride SR (Klor-Con 10) 10 mEq tablet Take 1 Tablet by mouth daily. carvediloL (COREG) 25 mg tablet Take 1 Tablet by mouth two (2) times daily (with meals). pregabalin (LYRICA) 100 mg capsule Take 100 mg by mouth two (2) times a day. diazePAM (VALIUM) 5 mg tablet Take 5 mg by mouth as needed. Pre Procedure    eszopiclone (LUNESTA) 3 mg tablet Take 3 mg by mouth nightly. ibuprofen (MOTRIN) 800 mg tablet Take 800 mg by mouth every six (6) hours as needed. promethazine (PHENERGAN) 25 mg tablet Take 25 mg by mouth every six (6) hours as needed. pregabalin (LYRICA) 200 mg capsule Take 200 mg by mouth nightly. anastrozole (ARIMIDEX) 1 mg tablet Take 1 mg by mouth daily. albuterol (PROVENTIL HFA, VENTOLIN HFA, PROAIR HFA) 90 mcg/actuation inhaler Take 1 Puff by inhalation every six (6) hours as needed for Wheezing. acetaminophen (TYLENOL) 325 mg tablet Take 650 mg by mouth every six (6) hours as needed for Pain. mineral oil-hydrophil petrolat (Aquaphor) ointment Apply  to affected area as needed for Dry Skin. beclomethasone (Qvar) 40 mcg/actuation aero Take 1 Puff by inhalation two (2) times a day. cholecalciferol (VITAMIN D3) (1000 Units /25 mcg) tablet Take 2,000 Units by mouth daily. dexAMETHasone (DECADRON) 4 mg tablet Take 4 mg by mouth every twelve (12) hours. diclofenac (VOLTAREN) 1 % gel Apply 2 g to affected area four (4) times daily. folic acid (FOLVITE) 1 mg tablet Take 1 mg by mouth daily. interferon beta-1a (AVONEX) 30 mcg/0.5 mL injection 30 mcg by IntraMUSCular route every seven (7) days. melatonin 3 mg tablet Take 3 mg by mouth nightly as needed for Insomnia.    meloxicam (MOBIC) 7.5 mg tablet Take 7.5 mg by mouth daily as needed. metFORMIN (GLUCOPHAGE) 500 mg tablet Take 500 mg by mouth two (2) times daily (with meals). mometasone (Asmanex Twisthaler) 220 mcg/ actuation (14) inhaler Take 1 Puff by inhalation daily. omeprazole (PRILOSEC OTC) 20 mg tablet Take 20 mg by mouth daily. ondansetron hcl (ZOFRAN) 8 mg tablet Take 8 mg by mouth every eight (8) hours as needed for Nausea or Vomiting. prochlorperazine (COMPAZINE) 5 mg tablet Take 5 mg by mouth every six (6) hours as needed for Nausea or Vomiting.    trospium (SANCTURA) 20 mg tablet Take 20 mg by mouth Before breakfast, lunch, and dinner. No current facility-administered medications for this visit. PHYSICAL EXAMINATION:  Appearance: Alert, well appearing and pleasant patient who is in no distress, oriented to person, place/time, and who follows commands. HEENT: Duong Newberry hears well, does not require hearing aids. Her sclera of the eyes are non-icteric. She is breathing normally and no respiratory accessory muscle use is noted. No JVD present and Neck ROM within normal limits. Psychiatric: Affect and mood are appropriate. Oriented x3  Cardiovascular/Peripheral Vascular: Normal pulses to each foot. Integumentary: No rashes. Warm and normal color. No drainage.    Gait: in a wheelchair  Sensory Exam: Intact/Normal Sensation    Lymphatic: No evidence of Lymphedema  Vascular:       Pulses: palpable  Varicosities none  Wounds/Abrasion: None Present  Neuro: Negative, no tremors  ORTHO EXAMINATION:  Examination Right knee Left knee   Skin Intact Intact   Range of motion 100-10 110-10   Effusion - -   Medial joint line tenderness + +   Lateral joint line tenderness - -   Popliteal tenderness - -   Osteophytes palpable + +   Jazzmines - -   Patella crepitus + +   Anterior drawer - -   Lateral laxity - -   Medial laxity - -   Varus deformity - -   Valgus deformity - -   Pretibial edema - -   Calf tenderness - -   Unable to get on exam table  In a wheelchair    TIME OUT:  Chart reviewed for the following:   Camille Rivas MD, have reviewed the History, Physical and updated the Allergic reactions for Lamneetu Lyons performed immediately prior to start of procedure:  Camille Rivas MD, have performed the following reviews on Duong Newberry prior to the start of the procedure:          * Patient was identified by name and date of birth   * Agreement on procedure being performed was verified  * Risks and Benefits explained to the patient  * Procedure site verified and marked as necessary  * Patient was positioned for comfort  * Consent was obtained     Time: 9:13 AM     Date of procedure: 9/21/2022  Procedure performed by:  Mickel Apley, MD  Ms. Salmon tolerated the procedure well with no complications. RADIOGRAPHS:  XR KNEES 12/10/21 CLAUDINE  Severe end stage osteoarthritis, complete medial narrowing  -I have independently reviewed these images during this office visit. -Dr. Roman Latham      XR RIGHT KNEE 11/9/21 CLAUDINE  Severe OA  -I have independently reviewed these images during this office visit. -Dr. Wilner Hadley:      ICD-10-CM ICD-9-CM    1. Primary osteoarthritis of left knee  M17.12 715.16 betamethasone (CELESTONE) injection 6 mg      DRAIN/INJECT LARGE JOINT/BURSA      PROCEDURE AUTHORIZATION TO       2. Primary osteoarthritis of right knee  M17.11 715.16 betamethasone (CELESTONE) injection 6 mg      DRAIN/INJECT LARGE JOINT/BURSA      PROCEDURE AUTHORIZATION TO       3. Chronic pain of right knee  M25.561 719.46 betamethasone (CELESTONE) injection 6 mg    G89.29 338.29 DRAIN/INJECT LARGE JOINT/BURSA      PROCEDURE AUTHORIZATION TO       4. Chronic pain of left knee  M25.562 719.46 betamethasone (CELESTONE) injection 6 mg    G89.29 338.29 DRAIN/INJECT LARGE JOINT/BURSA      PROCEDURE AUTHORIZATION TO           PLAN:  After discussing treatment options, patient's knees were injected with 4 cc Marcaine and 1/2 cc Celestone. Consider visco supplementation if pain continues. We discussed possible need for arthroplasty at some time in the future--BMI is a relative contraindication at this time. She will follow up as needed.       Scribed by Noemy Argueta (Luis Berger) as dictated by Mickel Apley, MD

## 2022-10-26 ENCOUNTER — DOCUMENTATION ONLY (OUTPATIENT)
Dept: ORTHOPEDIC SURGERY | Age: 62
End: 2022-10-26

## 2022-10-26 ENCOUNTER — OFFICE VISIT (OUTPATIENT)
Dept: ORTHOPEDIC SURGERY | Age: 62
End: 2022-10-26
Payer: MEDICARE

## 2022-10-26 VITALS — WEIGHT: 233 LBS | OXYGEN SATURATION: 99 % | TEMPERATURE: 96.9 F | BODY MASS INDEX: 47.06 KG/M2 | HEART RATE: 75 BPM

## 2022-10-26 DIAGNOSIS — M17.12 PRIMARY OSTEOARTHRITIS OF LEFT KNEE: Primary | ICD-10-CM

## 2022-10-26 DIAGNOSIS — G89.29 CHRONIC PAIN OF RIGHT KNEE: ICD-10-CM

## 2022-10-26 DIAGNOSIS — M17.11 PRIMARY OSTEOARTHRITIS OF RIGHT KNEE: ICD-10-CM

## 2022-10-26 DIAGNOSIS — G89.29 CHRONIC PAIN OF LEFT KNEE: ICD-10-CM

## 2022-10-26 DIAGNOSIS — M25.561 CHRONIC PAIN OF RIGHT KNEE: ICD-10-CM

## 2022-10-26 DIAGNOSIS — M25.562 CHRONIC PAIN OF LEFT KNEE: ICD-10-CM

## 2022-10-26 PROCEDURE — 20610 DRAIN/INJ JOINT/BURSA W/O US: CPT | Performed by: SPECIALIST

## 2022-10-26 NOTE — PROGRESS NOTES
Patient: Milton Fletcher                MRN: [id number]       SSN: [id number]  YOB: 1960        AGE: 60 y.o. SEX: female    PCP: Romy Flanagan DO  10/26/22    CC: BILATERAL KNEE PAIN    HISTORY:  Milton Fletcher is a 60 y.o. female who is seen for bilateral knee pain. She has been experiencing bilateral knee pain for the past several years. She does not recall any injury. She feels pain with standing, walking. Her ambulation is quite limited due to her severe knee arthritis. She experiences startup pain after sitting. She has been followed in the past for knee pain by Dr. Oscar Edmonds. She now presents for a second orthopedic opinion. She has a h/o breast cancer and is s/p mastectomy. Pain Assessment  10/26/2022   Location of Pain Knee   Location Modifiers Left;Right   Severity of Pain 7   Quality of Pain Throbbing; Sharp   Quality of Pain Comment -   Duration of Pain A few minutes   Frequency of Pain Intermittent   Aggravating Factors (No Data)   Aggravating Factors Comment anything   Limiting Behavior -   Relieving Factors Nothing   Relieving Factors Comment -   Result of Injury -     Occupation, etc:  Ms. Beka Oconnor is retired on [employer] [address]. She was a  for [employer] in [place]. She is not very active. She is a minimal household ambulator. She lives in [facility] with her  and 15year old grandson over whom she has guardianship. She has one son. Ms. Beka Oconnor weighs 233 lbs and is 4'11\" tall. No results found for: HBA1C, JPG1FDZF, HHZ0ROHV, PHA6YYTR  Weight Metrics 10/26/2022 6/2/2022 5/26/2022 3/31/2022 3/11/2022 2/7/2022 12/13/2021   Weight 233 lb 233 lb 233 lb - 230 lb 226 lb 12.8 oz 241 lb   BMI 47.06 kg/m2 47.06 kg/m2 47.06 kg/m2 46.45 kg/m2 46.45 kg/m2 45.81 kg/m2 48.68 kg/m2       There is no problem list on file for this patient.     REVIEW OF SYSTEMS:    Constitutional Symptoms: Negative   Eyes: Negative   Ears, Nose, Throat and Mouth: Negative   Cardiovascular: Negative   Respiratory: Negative   Genitourinary: Per HPI   Gastrointestinal: Per HPI   Integumentary (Skin and/or Breast): Negative   Musculoskeletal: Per HPI   Endocrine/Rheumatologic: Negative   Neurological: Per HPI   Hematology/Lymphatic: Negative    Allergic/Immunologic: Negative   Phychiatric: Negative    Social History     Socioeconomic History    Marital status:      Spouse name: Not on file    Number of children: Not on file    Years of education: Not on file    Highest education level: Not on file   Occupational History    Not on file   Tobacco Use    Smoking status: Never    Smokeless tobacco: Never   Vaping Use    Vaping Use: Never used   Substance and Sexual Activity    Alcohol use: Never    Drug use: Never    Sexual activity: Not on file   Other Topics Concern    Not on file   Social History Narrative    Not on file     Social Determinants of Health     Financial Resource Strain: Not on file   Food Insecurity: Not on file   Transportation Needs: Not on file   Physical Activity: Not on file   Stress: Not on file   Social Connections: Not on file   Intimate Partner Violence: Not on file   Housing Stability: Not on file      Allergies   Allergen Reactions    Penicillin G Hives and Rash      Current Outpatient Medications   Medication Sig    benazepriL (LOTENSIN) 40 mg tablet Take 1 Tablet by mouth daily. amLODIPine (Norvasc) 10 mg tablet Take 1 Tablet by mouth daily. atorvastatin (Lipitor) 40 mg tablet Take 1 Tablet by mouth daily. aspirin delayed-release 81 mg tablet Take 1 Tablet by mouth daily. chlorthalidone (HYGROTON) 25 mg tablet Take 1 Tablet by mouth daily. spironolactone (Aldactone) 50 mg tablet Take 1 Tablet by mouth daily. potassium chloride SR (Klor-Con 10) 10 mEq tablet Take 1 Tablet by mouth daily. carvediloL (COREG) 25 mg tablet Take 1 Tablet by mouth two (2) times daily (with meals).     pregabalin (LYRICA) 100 mg capsule Take 100 mg by mouth two (2) times a day. diazePAM (VALIUM) 5 mg tablet Take 5 mg by mouth as needed. Pre Procedure    eszopiclone (LUNESTA) 3 mg tablet Take 3 mg by mouth nightly. ibuprofen (MOTRIN) 800 mg tablet Take 800 mg by mouth every six (6) hours as needed. promethazine (PHENERGAN) 25 mg tablet Take 25 mg by mouth every six (6) hours as needed. pregabalin (LYRICA) 200 mg capsule Take 200 mg by mouth nightly. anastrozole (ARIMIDEX) 1 mg tablet Take 1 mg by mouth daily. albuterol (PROVENTIL HFA, VENTOLIN HFA, PROAIR HFA) 90 mcg/actuation inhaler Take 1 Puff by inhalation every six (6) hours as needed for Wheezing. acetaminophen (TYLENOL) 325 mg tablet Take 650 mg by mouth every six (6) hours as needed for Pain. mineral oil-hydrophil petrolat (Aquaphor) ointment Apply  to affected area as needed for Dry Skin. beclomethasone (Qvar) 40 mcg/actuation aero Take 1 Puff by inhalation two (2) times a day. cholecalciferol (VITAMIN D3) (1000 Units /25 mcg) tablet Take 2,000 Units by mouth daily. dexAMETHasone (DECADRON) 4 mg tablet Take 4 mg by mouth every twelve (12) hours. diclofenac (VOLTAREN) 1 % gel Apply 2 g to affected area four (4) times daily. folic acid (FOLVITE) 1 mg tablet Take 1 mg by mouth daily. interferon beta-1a (AVONEX) 30 mcg/0.5 mL injection 30 mcg by IntraMUSCular route every seven (7) days. melatonin 3 mg tablet Take 3 mg by mouth nightly as needed for Insomnia.    meloxicam (MOBIC) 7.5 mg tablet Take 7.5 mg by mouth daily as needed. metFORMIN (GLUCOPHAGE) 500 mg tablet Take 500 mg by mouth two (2) times daily (with meals). mometasone (Asmanex Twisthaler) 220 mcg/ actuation (14) inhaler Take 1 Puff by inhalation daily. omeprazole (PRILOSEC OTC) 20 mg tablet Take 20 mg by mouth daily. ondansetron hcl (ZOFRAN) 8 mg tablet Take 8 mg by mouth every eight (8) hours as needed for Nausea or Vomiting.     prochlorperazine (COMPAZINE) 5 mg tablet Take 5 mg by mouth every six (6) hours as needed for Nausea or Vomiting.    trospium (SANCTURA) 20 mg tablet Take 20 mg by mouth Before breakfast, lunch, and dinner. No current facility-administered medications for this visit. ORTHO EXAMINATION:  Examination Right knee Left knee   Skin Intact Intact   Range of motion 100-10 110-10   Effusion - -   Medial joint line tenderness + +   Lateral joint line tenderness - -   Popliteal tenderness - -   Osteophytes palpable + +   Jazzmines - -   Patella crepitus + +   Anterior drawer - -   Lateral laxity - -   Medial laxity - -   Varus deformity - -   Valgus deformity - -   Pretibial edema - -   Calf tenderness - -   Unable to get on exam table  In a wheelchair    TIME OUT:  Chart reviewed for the following:   Omar Lee MD, have reviewed the History, Physical and updated the Allergic reactions for [address] performed immediately prior to start of procedure:  Omar Lee MD, have performed the following reviews on [facility] prior to the start of the procedure:          * Patient was identified by name and date of birth   * Agreement on procedure being performed was verified  * Risks and Benefits explained to the patient  * Procedure site verified and marked as necessary  * Patient was positioned for comfort  * Consent was obtained     Time: 9:51 AM    Date of procedure: 10/26/2022  Procedure performed by:  Susu Villa MD  Ms. Salmon tolerated the procedure well with no complications. RADIOGRAPHS:  XR KNEES 12/10/21 CLAUDINE  Severe end stage osteoarthritis, complete medial narrowing  -I have independently reviewed these images during this office visit. -Dr. Andrew Corcoran      XR RIGHT KNEE 11/9/21 CLAUDINE  Severe OA  -I have independently reviewed these images during this office visit. -Dr. Tung Rizzo:      ICD-10-CM ICD-9-CM    1.  Primary osteoarthritis of left knee  M17.12 715.16 REFERRAL TO PHYSICAL THERAPY sodium hyaluronate (SUPARTZ FX/EUFLEXXA/HYALGAN) 10 mg/mL injection syrg 20 mg      DRAIN/INJECT LARGE JOINT/BURSA      2. Primary osteoarthritis of right knee  M17.11 715.16 REFERRAL TO PHYSICAL THERAPY      sodium hyaluronate (SUPARTZ FX/EUFLEXXA/HYALGAN) 10 mg/mL injection syrg 20 mg      DRAIN/INJECT LARGE JOINT/BURSA      3. Chronic pain of right knee  M25.561 719.46 REFERRAL TO PHYSICAL THERAPY    G89.29 338.29 sodium hyaluronate (SUPARTZ FX/EUFLEXXA/HYALGAN) 10 mg/mL injection syrg 20 mg      DRAIN/INJECT LARGE JOINT/BURSA      4. Chronic pain of left knee  M25.562 719.46 REFERRAL TO PHYSICAL THERAPY    G89.29 338.29 sodium hyaluronate (SUPARTZ FX/EUFLEXXA/HYALGAN) 10 mg/mL injection syrg 20 mg      DRAIN/INJECT LARGE JOINT/BURSA          PLAN:  She will start a brief course of outpatient physical therapy. After discussing treatment options, patient's knees were injected with 2 cc Euflexxa. There is no need for surgery. She will follow up in 1 week for Euflexxa #2.        Scribed by Allie Atkinson ([address]) as dictated by Everardo Mattson MD

## 2022-10-31 ENCOUNTER — HOSPITAL ENCOUNTER (OUTPATIENT)
Dept: PHYSICAL THERAPY | Age: 62
Discharge: HOME | End: 2022-10-31
Payer: MEDICARE

## 2022-10-31 PROCEDURE — 97530 THERAPEUTIC ACTIVITIES: CPT

## 2022-10-31 PROCEDURE — 97162 PT EVAL MOD COMPLEX 30 MIN: CPT

## 2022-10-31 NOTE — PROGRESS NOTES
[facility] - [facility] Rogelio Kessler Daquan [address]  [phone] fax  Plan of Care/ Statement of Necessity for Physical Therapy Services    Patient name: Edvin Sauer Start of Care: 10/31/2022   Referral source: Ghulam Crouch MD : 1960    Medical Diagnosis: Pain in right knee [M25.561]  Pain in left knee [M25.562]  Unilateral primary osteoarthritis, left knee [M17.12]  Unilateral primary osteoarthritis, right knee [M17.11]  Payor: [place] MEDICARE / Plan: VA MEDICARE PART A & B / Product Type: Medicare /  Onset Date:10/26/22 (script)    Treatment Diagnosis: B Knee pain   Prior Hospitalization: see medical history Provider#: [id number]   Medications: Verified on Patient summary List    Comorbidities: Angina; Arthritis; Asthma; Back pain; BMI > 30; hx Breast CA - on chemo pill; DM Type II; Headaches; Heart disease; HTN; Multiple Sclerosis; Osteoporosis; hx CVA   Prior Level of Function: Retired; lives in [place] with spouse; use wheelchairs mainly, Rollator in home, community      The Plan of Care and following information is based on the information from the initial evaluation. Assessment/ key information: Pt is a 60 y.o. female who presents with c/o worsening B knee pain that began insidiously several years ago. Pt has had injections and just started Euflexxa injection series one week ago. Functional deficits include:  limited ambulator in home, difficulty with bathing, dressing - spouse assists, unable to lie supine, lies on right side mainly for sleep, navigates in home with wheelchairs mostly but uses Rollator for very short distances, unable to perform cooking or household chores, negotiates stairs by crawling ascending, scooting on bottom for descending, limited standing tolerance of 4 minutes - leaned FWD on Rollator.  Upon exam, Pt exhibited impaired B knee ROM, impaired B LE strength, impaired standing balance, and decreased functional LE strength/endurance. Pt would benefit from skilled PT in aquatic setting, transitioning to land as appropriate, to address above deficits to improve Pt's functional mobility and strength with less pain. Evaluation Complexity History MEDIUM  Complexity : 1-2 comorbidities / personal factors will impact the outcome/ POC ; Examination MEDIUM Complexity : 3 Standardized tests and measures addressing body structure, function, activity limitation and / or participation in recreation  ;Presentation MEDIUM Complexity : Evolving with changing characteristics  ; Clinical Decision Making MEDIUM Complexity : FOTO score of 26-74  Overall Complexity Rating: MEDIUM  Problem List: pain affecting function, decrease ROM, decrease strength, edema affecting function, impaired gait/ balance, decrease ADL/ functional abilitiies, decrease activity tolerance, decrease flexibility/ joint mobility, and decrease transfer abilities   Treatment Plan may include any combination of the following: Therapeutic exercise, Neuromuscular reeducation, Manual therapy, Therapeutic activity, Self care/home management, Electric stim unattended , Aquatic therapy, and Gait training  Patient / Family readiness to learn indicated by: asking questions and interest  Persons(s) to be included in education: patient (P) and family support person (FSP);list spouse  Barriers to Learning/Limitations: None  Patient Goal (s): Minimize the pain, possibly be more mobile.   Patient Self Reported Health Status: poor  Rehabilitation Potential: good    Short Term Goals: To be accomplished in 1 weeks:  Goal: Pt to be compliant with initial HEP to improve functional LE strength and stability for ease of transfers, ADLs. Status at last note/certification: Established and reviewed with Pt  Goal: Pt to initiate aquatics program without increased pain to aid in achievement of all LTGs. Status at last note/certification: N/A  Long Term Goals:  To be accomplished in 5 weeks:  Goal: Pt to perform at least 6 sit to stands in 30 seconds without UE support or fatigue to show improved LE strength and endurance. Status at last note/certification: 3 x with B UE support, FWD head/trunk, bent knee posture  Goal: Pt to increase standing/amb tolerance to 10 minutes without deviations for increased independence with bathing, dressing, light chore tasks. Status at last note/certification: 4 minute tolerance with leaning over Rollator  Goal: Pt to report < 5/10 pain at worst to increase ease with ADLs. Status at last note/certification: 76/53 pain at worst  Goal: Pt to report FOTO score of 38 pts to show improved function and quality of life. Status at last note/certification: FOTO 33 pts     Frequency / Duration: Patient to be seen 2 times per week for 5 weeks. Patient/ Caregiver education and instruction: Diagnosis, prognosis, exercises   [x]  Plan of care has been reviewed with PTA    Certification Period: 10/31/22 - 11/29/22  Kay Singh, PT 10/31/2022 10:21 AM  _____________________________________________________________________  I certify that the above Therapy Services are being furnished while the patient is under my care. I agree with the treatment plan and certify that this therapy is necessary.     [de-identified] Signature:____________Date:_________TIME:________     Fely Nelson MD  ** Signature, Date and Time must be completed for valid certification **    Please sign and return to [facility] - [address]  [phone] fax

## 2022-10-31 NOTE — PROGRESS NOTES
PT DAILY TREATMENT NOTE     Patient Name: Leeroy Mansfiedl  Date:10/31/2022  : 1960  [x]  Patient  Verified  Payor: VA MEDICARE / Plan: VA MEDICARE PART A & B / Product Type: Medicare /    In time:8:30  Out time:9:15  Total Treatment Time (min): 45  Visit #: 1 of 10    Medicare/BCBS Only   Total Timed Codes (min):  23 1:1 Treatment Time:  45       Treatment Area: Pain in right knee [M25.561]  Pain in left knee [M25.562]  Unilateral primary osteoarthritis, left knee [M17.12]  Unilateral primary osteoarthritis, right knee [M17.11]    SUBJECTIVE  Pain Level (0-10 scale): 7/10  Any medication changes, allergies to medications, adverse drug reactions, diagnosis change, or new procedure performed?: [x] No    [] Yes (see summary sheet for update)  Subjective functional status/changes:   [] No changes reported    Chief Complaint: B knee pain  History/Mechanism of Injury: Pt with chronic hx of B knee pain that began years ago that was being treated with Toradol shots for pain but had to put treatment on hold to undergo treatments for Breast CA. Now on chemo pill and goes back and forth to [place] for treatments. Since, the CA treatments, pain has worsened in B knees, coupled with weight gain due to decreased mobility.   Current Symptoms/Deficits: limited ambulator in home, difficulty with bathing, dressing - spouse assists, unable to lie supine, lies on right side mainly for sleep, navigates in home with wheelchairs mostly but uses Rollator for very short distances, unable to perform cooking or household chores, negotiates stairs by crawling ascending, scooting on bottom for descending, limited standing tolerance of 4 minutes - leaned FWD on Rollator  Pain-  Current: 7/10     Worst: 10/10   Best: 4/10  Previous Treatment/Compliance: One month ago cortisone injection; just started Euflexa injection series one week ago  Mobility Devices: Wheelchair - 1 upstairs, 1 downstairs - for home and community; Rollator for short distances in home  PMHx/Surgical Hx: Angina; Arthritis; Asthma; Back pain; BMI > 30; hx Breast CA - on chemo pill; DM Type II; Headaches; Heart disease; HTN; Multiple Sclerosis; Osteoporosis; hx CVA  Work Hx: Not working - retired  Living Situation: 2-story home with spouse, negotiates stairs ascending by crawling, descending on bottom with scooting  Household Modifications: spouse does all cooking and cleaning but Pt helps with laundry  Hobbies:   Pt Goals: \"Minimize the pain, possibly be more mobile. \"    OBJECTIVE    22 min [x]Eval                  []Re-Eval     23 min Therapeutic Activity:  []  See flow sheet : Patient education on therapy assessment, prognosis, expectations for therapy sessions, patient goals, aquatic benefits/rules, and HEP. Rationale: to improve the patients ability to adhere to HEP and therapy sessions for increased compliance when working toward therapy goals.            With   [] TE   [x] TA   [] neuro   [] other: Patient Education: [x] Review HEP    [] Progressed/Changed HEP based on:   [] positioning   [] body mechanics   [] transfers   [] heat/ice application    [] other:      Other Objective/Functional Measures: FOTO 33 pts    Observation: FWD head/trunk posture with slight bend in knees with standing  Palpation: TTP at peripatellar region B    Knee AROM:  Right Knee: -9-110 deg  Left Knee -9-116 deg    Patellar Mobility:  Right- hypomobile  Left- hypomobile    Strength:   Right (/5) Left (/5)   Hip     Flexion 3+ 3+             Abduction 3+ 3+             Adduction NT NT             Extension 3+ 4-             ER 3+ 3+             IR 3+ 3+   Knee   Extension 5 5              Flexion 5 5   Ankle   Dorsiflexion 3+ 5               PF 5 (reps) 5 (reps)               Inversion 5 5               Eversion 5 5     Girth:    Right Left   6 in above       Midpatellar NT NT   2 in below     4 in below            -    Lumbar/LE Screen: supine Bridge test negative for LBP    Gait: step to, shuffling/scooting gait very short distances without A.D. Sit to Stand test: 3 x with B UE support at inth with fatigue, FWD head/trunk, knee flexion posture    Functional Squat: unable to perform    Stair Negotiation: crawls ascending, scoots on bottom descending    Balance: Romberg EC - increased sway, close SBA ot CGA    Reflexes/Sensation: intact to light touch    Special Tests:      Right Left   Hamstring 90/90       Anise Litten       -   Right Left   Valgus at 30 deg       Varus at 30 deg     Anterior Drawer     Posterior Drawer     Lachmans     Sag Sign     -   Right Left   Meniscal Grind       Jazzmine's     Thessaly         Pain Level (0-10 scale) post treatment: 7/10    ASSESSMENT/Changes in Function: See POC    Patient will continue to benefit from skilled PT services to modify and progress therapeutic interventions, address functional mobility deficits, address ROM deficits, address strength deficits, analyze and address soft tissue restrictions, analyze and cue movement patterns, analyze and modify body mechanics/ergonomics, assess and modify postural abnormalities, address imbalance/dizziness and instruct in home and community integration to attain remaining goals.      [x]  See Plan of Care  []  See progress note/recertification  []  See Discharge Summary         Progress towards goals / Updated goals:  See POC    PLAN  [x]  Upgrade activities as tolerated     []  Continue plan of care  [x]  Update interventions per flow sheet       []  Discharge due to:_  []  Other:_      Amanda Singh, PT 10/31/2022  7:52 AM    Future Appointments   Date Time Provider Raysa Ibanezi   10/31/2022  8:15 AM Amanda Singh, PT [facility]   11/2/2022  8:50 AM Kadie Case MD [facility] BS AMB   11/9/2022  8:50 AM Austin Boo MD [facility] BS AMB   11/17/2022 10:00 AM Jessica Ontiveros NP [facility] BS AMB   5/25/2023 10:40 AM Denilson Espinoza DO [facility] BS AMB

## 2022-11-01 NOTE — PROGRESS NOTES
Patient: Cristhian Rivera                MRN: [id number]       SSN: [id number]  YOB: 1960        AGE: 60 y.o. SEX: female  There is no height or weight on file to calculate BMI. PCP: Vikash Valdez DO  11/02/22    Chief Complaint   Patient presents with    Knee Pain     Euflexxa bilateral knees 2       HISTORY:  Cristhian Rivera is a 60 y.o. female who is seen for bilateral knee pain. She reports benefit after the first injection. She is scheduled to begin aquatic physical therapy tomorrow. ICD-10-CM ICD-9-CM    1. Primary osteoarthritis of left knee  M17.12 715.16 DRAIN/INJECT LARGE JOINT/BURSA      sodium hyaluronate (SUPARTZ FX/EUFLEXXA/HYALGAN) 10 mg/mL injection syrg 20 mg      lidocaine (LIDODERM) 5 %      2. Primary osteoarthritis of right knee  M17.11 715.16 DRAIN/INJECT LARGE JOINT/BURSA      sodium hyaluronate (SUPARTZ FX/EUFLEXXA/HYALGAN) 10 mg/mL injection syrg 20 mg      lidocaine (LIDODERM) 5 %      3. Chronic pain of right knee  M25.561 719.46 lidocaine (LIDODERM) 5 %    G89.29 338.29       4. Chronic pain of left knee  M25.562 719.46 lidocaine (LIDODERM) 5 %    G89.29 338.29           Chart reviewed for the following:   I, Richi Wagner MD, have reviewed the History, Physical and updated the Allergic reactions for [address] performed immediately prior to start of procedure:  Hernando Patel MD, [facility] performed the [facility] to [facility] start of the procedure:            * Patient was identified by name and date of birth   * Agreement on procedure being performed was verified  * Risks and Benefits explained to the patient  * Procedure site verified and marked as necessary  * Patient was positioned for comfort  * Consent was obtained     Time: 8:38 AM     Date of procedure: 11/2/2022    Procedure performed by:  Richi Wagner MD    Ms. Salmon tolerated the procedure well with no complications. PLAN: Rx Lidoderm 5% patches. After discussing treatment options, patient's bilateral knees were each injected with 2 cc of Euflexxa. Ms. Yessi Nieto will follow up in one week to complete her visco supplementation injection series.       Scribed by Carla Mendenhall ([address] [place] [address] [address]) as dictated by Arnoldo Zambrano MD

## 2022-11-02 ENCOUNTER — OFFICE VISIT (OUTPATIENT)
Dept: ORTHOPEDIC SURGERY | Age: 62
End: 2022-11-02
Payer: MEDICARE

## 2022-11-02 VITALS — TEMPERATURE: 97.5 F

## 2022-11-02 DIAGNOSIS — M25.562 CHRONIC PAIN OF LEFT KNEE: ICD-10-CM

## 2022-11-02 DIAGNOSIS — M17.12 PRIMARY OSTEOARTHRITIS OF LEFT KNEE: Primary | ICD-10-CM

## 2022-11-02 DIAGNOSIS — M17.11 PRIMARY OSTEOARTHRITIS OF RIGHT KNEE: ICD-10-CM

## 2022-11-02 DIAGNOSIS — G89.29 CHRONIC PAIN OF RIGHT KNEE: ICD-10-CM

## 2022-11-02 DIAGNOSIS — M25.561 CHRONIC PAIN OF RIGHT KNEE: ICD-10-CM

## 2022-11-02 DIAGNOSIS — G89.29 CHRONIC PAIN OF LEFT KNEE: ICD-10-CM

## 2022-11-02 PROCEDURE — 20610 DRAIN/INJ JOINT/BURSA W/O US: CPT | Performed by: SPECIALIST

## 2022-11-02 RX ORDER — LIDOCAINE 50 MG/G
PATCH TOPICAL
Qty: 60 PATCH | Refills: 2 | Status: SHIPPED | OUTPATIENT
Start: 2022-11-02

## 2022-11-03 ENCOUNTER — HOSPITAL ENCOUNTER (OUTPATIENT)
Dept: PHYSICAL THERAPY | Age: 62
Discharge: HOME | End: 2022-11-03
Payer: MEDICARE

## 2022-11-03 PROCEDURE — 97113 AQUATIC THERAPY/EXERCISES: CPT

## 2022-11-08 ENCOUNTER — HOSPITAL ENCOUNTER (OUTPATIENT)
Dept: PHYSICAL THERAPY | Age: 62
Discharge: HOME | End: 2022-11-08
Payer: MEDICARE

## 2022-11-08 PROCEDURE — 97113 AQUATIC THERAPY/EXERCISES: CPT

## 2022-11-09 ENCOUNTER — OFFICE VISIT (OUTPATIENT)
Dept: ORTHOPEDIC SURGERY | Age: 62
End: 2022-11-09
Payer: MEDICARE

## 2022-11-09 VITALS — HEIGHT: 59 IN | BODY MASS INDEX: 46.97 KG/M2 | WEIGHT: 233 LBS | TEMPERATURE: 97.1 F

## 2022-11-09 DIAGNOSIS — M17.11 PRIMARY OSTEOARTHRITIS OF RIGHT KNEE: ICD-10-CM

## 2022-11-09 DIAGNOSIS — M17.12 PRIMARY OSTEOARTHRITIS OF LEFT KNEE: Primary | ICD-10-CM

## 2022-11-09 PROCEDURE — 20610 DRAIN/INJ JOINT/BURSA W/O US: CPT | Performed by: SPECIALIST

## 2022-11-09 NOTE — PROGRESS NOTES
Patient: Aldo Staples                MRN: [id number]       SSN: [id number]  YOB: 1960        AGE: 60 y.o. SEX: female  Body mass index is 47.06 kg/m². PCP: Roscoe Guillory DO  11/09/22    Chief Complaint   Patient presents with    Injection     Bilat knee euflexaa #3     HISTORY:  Aldo Staples is a 60 y.o. female who is seen for knee pain. TIME OUT performed immediately prior to start of procedure:  Vincent Morrison MD, have performed the following reviews on Paz Lopez prior to the start of the procedure:            * Patient was identified by name and date of birth   * Agreement on procedure being performed was verified  * Risks and Benefits explained to the patient  * Procedure site verified and marked as necessary  * Patient was positioned for comfort  * Consent was obtained     Time: 7:48 AM     Date of procedure: 11/9/2022    Procedure performed by:  Merlin San MD    Ms. Salmon tolerated the procedure well with no complications      [id number] ICD-9-CM    1. Primary osteoarthritis of left knee  M17.12 715.16 DRAIN/INJECT LARGE JOINT/BURSA      sodium hyaluronate (SUPARTZ FX/EUFLEXXA/HYALGAN) 10 mg/mL injection syrg 20 mg      2. Primary osteoarthritis of right knee  M17.11 715.16 DRAIN/INJECT LARGE JOINT/BURSA      sodium hyaluronate (SUPARTZ FX/EUFLEXXA/HYALGAN) 10 mg/mL injection syrg 20 mg        PLAN:  After discussing treatment options, patient's knees were each injected with 2 cc of Euflexxa. Ms. Karl Lopez will follow up PRN now that she has completed her visco supplementation injection series.       Scribed by Sonia Steele (Amara Bernard) as dictated by Merlin San MD

## 2022-11-10 ENCOUNTER — HOSPITAL ENCOUNTER (OUTPATIENT)
Dept: PHYSICAL THERAPY | Age: 62
Discharge: HOME | End: 2022-11-10
Payer: MEDICARE

## 2022-11-10 PROCEDURE — 97113 AQUATIC THERAPY/EXERCISES: CPT

## 2022-11-15 ENCOUNTER — HOSPITAL ENCOUNTER (OUTPATIENT)
Dept: PHYSICAL THERAPY | Age: 62
Discharge: HOME | End: 2022-11-15
Payer: MEDICARE

## 2022-11-15 PROCEDURE — 97113 AQUATIC THERAPY/EXERCISES: CPT

## 2022-11-15 NOTE — PROGRESS NOTES
Ruben Azul presents today for a 6 month follow-up of hypertension. She was last seen by Dr. Juaquin Mckeon in May 2022. During that visit, her metoprolol was switched to carvedilol 25mg BID. She tells me that she has been taking both of her carvedilol tablets once a day in the evening and actually takes all of her medications in the evening. She took her medications around 9pm last night. She states that last night while watching television, she experienced an episode of her heart pounding and \"going fast.\"  It did not last long but it was enough to frighten her. She has also had some left arm discomfort but she has experienced this in the past since her left breast cancer was diagnosed and she underwent left mastectomy. She reports that she has not had any chest pain or shortness of breath. She participates in water therapy and does not experience any cardiac symptoms while doing her pool exercises. She reports that her BP at her PCP's office last week was in the 130's over 70's last week. They had to rush to get here and then had to park a long distance from the building. She also states that she used salt on her boiled egg this morning when she normally does not add salt to her foods. She is a 60year old female with history of CAD and myocardial infarctions x 2 (in 2000 and 2009), asthma, diabetes, GERD, hypertension, hypercholesterolemia, Multiple Sclerosis, obstructive sleep apnea, and DVT (2002). In 2020, she underwent a left heart cath in the setting of ongoing chest and shoulder pain back at [facility]. It showed no obstructive coronary disease but there was mild diffuse disease in the RCA up to 25% in the midportion. She was diagnosed in August 2020 of breast cancer and is s/p mastectomy and completed chemotherapy. She had a normal ejection fraction back in 2019. In June 2022, she went to the ER with headaches, nausea, vomiting.     Denies chest pain, tightness, heaviness, and admits to palpitations. Denies shortness of breath at rest, dyspnea on exertion, orthopnea and PND. Denies abdominal bloating. Denies lightheadedness, dizziness, and syncope. Denies lower extremity edema and claudication. Denies nausea, vomiting, diarrhea, melena, hematochezia. Denies hematuria, urgency, frequency. Denies fever, chills. PMH:  Past Medical History:   Diagnosis Date    Anemia     Arthritis     Asthma     Well controlled    Autoimmune disease ([facility]     Back pain     Breast cancer ([facility] 08/2020    left     Cervical cancer ([facility] 11/09/2013    Diabetes ([facility]     NIDDM    GERD (gastroesophageal reflux disease)     Heart failure (HCC)     h/o CVA right sided weakness per cardiac note 11/2021 cc    High cholesterol     Hypertension     Lumbar herniated disc     L4/L5, S1/S2    MI (mitral incompetence) 2002    MI (myocardial infarction) ([facility] 2000, 2009    MS (multiple sclerosis) ([facility]     Right knee pain     Sleep apnea     CPAP    Thromboembolism of deep veins of lower extremity, left ([facility] 2002    Unexplained weight gain     Wheelchair dependent 2006    MS       PSH:  Past Surgical History:   Procedure Laterality Date    COLONOSCOPY N/A 2/7/2022    COLONOSCOPY with Polypectomies performed by Perlita Lyle MD at [address] [place] [address]  09/04/2020    Minor blockages    HX HYSTERECTOMY  11/06/2013    HX MASTECTOMY Left 12/07/2020    HX OOPHORECTOMY  11/06/2013       MEDS:  Current Outpatient Medications   Medication Sig    Blood Pressure Kit-Extra Large kit 1 Kit by Does Not Apply route daily as needed (HTN). lidocaine (LIDODERM) 5 % Apply patch to the bilateral knees for 12 hours a day and remove for 12 hours a day. benazepriL (LOTENSIN) 40 mg tablet Take 1 Tablet by mouth daily. amLODIPine (Norvasc) 10 mg tablet Take 1 Tablet by mouth daily. atorvastatin (Lipitor) 40 mg tablet Take 1 Tablet by mouth daily.     aspirin delayed-release 81 mg tablet Take 1 Tablet by mouth daily. chlorthalidone (HYGROTON) 25 mg tablet Take 1 Tablet by mouth daily. spironolactone (Aldactone) 50 mg tablet Take 1 Tablet by mouth daily. potassium chloride SR (Klor-Con 10) 10 mEq tablet Take 1 Tablet by mouth daily. carvediloL (COREG) 25 mg tablet Take 1 Tablet by mouth two (2) times daily (with meals). pregabalin (LYRICA) 100 mg capsule Take 100 mg by mouth two (2) times a day. diazePAM (VALIUM) 5 mg tablet Take 5 mg by mouth as needed. Pre Procedure    eszopiclone (LUNESTA) 3 mg tablet Take 3 mg by mouth nightly. ibuprofen (MOTRIN) 800 mg tablet Take 800 mg by mouth every six (6) hours as needed. promethazine (PHENERGAN) 25 mg tablet Take 25 mg by mouth every six (6) hours as needed. pregabalin (LYRICA) 200 mg capsule Take 200 mg by mouth nightly. anastrozole (ARIMIDEX) 1 mg tablet Take 1 mg by mouth daily. albuterol (PROVENTIL HFA, VENTOLIN HFA, PROAIR HFA) 90 mcg/actuation inhaler Take 1 Puff by inhalation every six (6) hours as needed for Wheezing. acetaminophen (TYLENOL) 325 mg tablet Take 650 mg by mouth every six (6) hours as needed for Pain. mineral oil-hydrophil petrolat (Aquaphor) ointment Apply  to affected area as needed for Dry Skin. beclomethasone (Qvar) 40 mcg/actuation aero Take 1 Puff by inhalation two (2) times a day. cholecalciferol (VITAMIN D3) (1000 Units /25 mcg) tablet Take 2,000 Units by mouth daily. dexAMETHasone (DECADRON) 4 mg tablet Take 4 mg by mouth every twelve (12) hours. diclofenac (VOLTAREN) 1 % gel Apply 2 g to affected area four (4) times daily. folic acid (FOLVITE) 1 mg tablet Take 1 mg by mouth daily. interferon beta-1a (AVONEX) 30 mcg/0.5 mL injection 30 mcg by IntraMUSCular route every seven (7) days. melatonin 3 mg tablet Take 3 mg by mouth nightly as needed for Insomnia.    meloxicam (MOBIC) 7.5 mg tablet Take 7.5 mg by mouth daily as needed.     metFORMIN (GLUCOPHAGE) 500 mg tablet Take 500 mg by mouth two (2) times daily (with meals). mometasone (Asmanex Twisthaler) 220 mcg/ actuation (14) inhaler Take 1 Puff by inhalation daily. omeprazole (PRILOSEC OTC) 20 mg tablet Take 20 mg by mouth daily. ondansetron hcl (ZOFRAN) 8 mg tablet Take 8 mg by mouth every eight (8) hours as needed for Nausea or Vomiting. prochlorperazine (COMPAZINE) 5 mg tablet Take 5 mg by mouth every six (6) hours as needed for Nausea or Vomiting.    trospium (SANCTURA) 20 mg tablet Take 20 mg by mouth Before breakfast, lunch, and dinner. No current facility-administered medications for this visit. Allergies and Sensitivities:  Allergies   Allergen Reactions    Penicillin G Hives and Rash       Family History:  No family history on file. Social History:  She  reports that she has never smoked. She has never used smokeless tobacco.  She  reports no history of alcohol use. Physical:  Visit Vitals  BP (!) 200/120 (BP 1 Location: Right upper arm, BP Patient Position: Sitting, BP Cuff Size: Adult long)   Ht 4' 11\" (1.499 m)   Wt 104.8 kg (231 lb)   SpO2 98%   BMI 46.66 kg/m²         Exam:  Neck:  Supple, no JVD, no carotid bruits  CV:  Normal S1 and  S2, no murmurs, rubs, or gallops noted  Lungs:  Clear to ausculation throughout, no wheezes or rales  Abd:  Soft, non-tender, non-distended with good bowel sounds.   No hepatosplenomegaly  Extremities:  No edema      Data:  EKG:      LABS:  Lab Results   Component Value Date/Time    Sodium 139 06/02/2022 11:35 PM    Potassium 3.3 (L) 06/02/2022 11:35 PM    Chloride 102 06/02/2022 11:35 PM    CO2 26 06/02/2022 11:35 PM    Glucose 208 (H) 06/02/2022 11:35 PM    BUN 15 06/02/2022 11:35 PM    Creatinine 0.67 06/02/2022 11:35 PM     No results found for: CHOL, CHOLX, CHLST, CHOLV, HDL, HDLP, LDL, LDLC, DLDLP, TGLX, TRIGL, TRIGP, CHHD, CHHDX  Lab Results   Component Value Date/Time    ALT (SGPT) 19 06/02/2022 11:35 PM         Impression/Plan:  Hypertension, blood pressure very elevated  Hypercholesterolemia, on atorvastatin 40mg  Diabetes mellitus, recommend Hgb A1c less than 7% from cardiac standpoint  CAD, s/p MI's x 2   Asthma  Multiple Sclerosis    Ms. Gloria Claire was seen today for a 6 month follow-up of hypertension. Her blood pressure today was very elevated but she states that they have had a busy morning and was rushing to get here. Her blood pressure when I rechecked it was 200/120. She has been taking all of her medications at night around 9pm.  She has been taking both carvedilol tablets at one time. She was instructed to take her amlodipine, carvedilol, and chlorthalidone as soon as she gets home and she will need to take these medications in the morning starting today. She will take her benazepril, spironolactone, atorvastatin and second dose of carvedilol in the evening. I asked that she take her medications as instructed above to get better 24 hour coverage for her blood pressure. Rationale explained to her and her  and their questions were answered. She was asked to monitor her blood pressures at home and record the readings (will purchase home BP monitor with a large/extra large upper arm cuff). Prescription for BP cuff given. She reports that she has follow-up at [facility] next week and she will have her blood pressure checked while she is there. I asked that she record the blood pressure and call us with the reading. She reports an episode of palpitations last night that occurred while watching television. She states that her heart was \"pounding\" and her heart was beating fast.  It did not last long. She is not sure if the left arm pain that she had last night was associated with the palpitations or the pain the she has experienced in her left arm in the past since her breast cancer diagnosis and left mastectomy.   Cath report from 2020 showed no obstructive coronary disease but did show mild diffuse disease 25% in the midportion of the RCA.  She is able to exercise (pool exercises/water therapy) without any shortness of breath and chest pain. For now will request a 30 day event monitor to evaluate her palpitations. Time:  40 minutes    She will follow-up with Dr. Catie Conroy as scheduled and as needed. Marilin Johnson MSN, FNP-BC    Please note:  Portions of this chart were created with Dragon medical speech to text program.  Unrecognized errors may be present.

## 2022-11-17 ENCOUNTER — TELEPHONE (OUTPATIENT)
Dept: PHYSICAL THERAPY | Age: 62
End: 2022-11-17

## 2022-11-17 ENCOUNTER — APPOINTMENT (OUTPATIENT)
Dept: PHYSICAL THERAPY | Age: 62
End: 2022-11-17
Payer: MEDICARE

## 2022-11-17 ENCOUNTER — OFFICE VISIT (OUTPATIENT)
Dept: CARDIOLOGY CLINIC | Age: 62
End: 2022-11-17
Payer: MEDICARE

## 2022-11-17 VITALS
HEIGHT: 59 IN | DIASTOLIC BLOOD PRESSURE: 120 MMHG | WEIGHT: 231 LBS | OXYGEN SATURATION: 98 % | SYSTOLIC BLOOD PRESSURE: 200 MMHG | BODY MASS INDEX: 46.57 KG/M2

## 2022-11-17 DIAGNOSIS — I10 HTN, GOAL BELOW 140/90: ICD-10-CM

## 2022-11-17 DIAGNOSIS — R00.2 PALPITATIONS: Primary | ICD-10-CM

## 2022-11-17 PROCEDURE — 3078F DIAST BP <80 MM HG: CPT | Performed by: NURSE PRACTITIONER

## 2022-11-17 PROCEDURE — G8417 CALC BMI ABV UP PARAM F/U: HCPCS | Performed by: NURSE PRACTITIONER

## 2022-11-17 PROCEDURE — 3074F SYST BP LT 130 MM HG: CPT | Performed by: NURSE PRACTITIONER

## 2022-11-17 PROCEDURE — G8755 DIAS BP > OR = 90: HCPCS | Performed by: NURSE PRACTITIONER

## 2022-11-17 PROCEDURE — 99215 OFFICE O/P EST HI 40 MIN: CPT | Performed by: NURSE PRACTITIONER

## 2022-11-17 PROCEDURE — G8753 SYS BP > OR = 140: HCPCS | Performed by: NURSE PRACTITIONER

## 2022-11-17 PROCEDURE — 3017F COLORECTAL CA SCREEN DOC REV: CPT | Performed by: NURSE PRACTITIONER

## 2022-11-17 PROCEDURE — G8432 DEP SCR NOT DOC, RNG: HCPCS | Performed by: NURSE PRACTITIONER

## 2022-11-17 PROCEDURE — G8427 DOCREV CUR MEDS BY ELIG CLIN: HCPCS | Performed by: NURSE PRACTITIONER

## 2022-11-17 RX ORDER — BLOOD PRESSURE TEST KIT-MEDIUM
1 KIT MISCELLANEOUS
Qty: 1 KIT | Refills: 0 | Status: SHIPPED | OUTPATIENT
Start: 2022-11-17

## 2022-11-17 NOTE — PATIENT INSTRUCTIONS
4 week event monitor;  Dx: palpitations  As soon as you get home, please take a dose of carvedilol, amlodipine, and chlorthalidone (all of these medications should be taken in the morning)  Take benazepril, spironolactone, atorvastatin, and second dose of carvedilol in the evening  Take potassium and aspirin anytime during the day or evening  BMP today or at your earliest convenience  Follow-up with Dr. Martina Collet in May 2023 as scheduled   Monitor blood pressures at home and record the readings (take BP at least 2-3 hours after taking medications)  Low sodium diet, 1500-2000mg per day

## 2022-11-17 NOTE — PROGRESS NOTES
Uvaldo Chacko presents today for   Chief Complaint   Patient presents with    Follow-up     6 month follow up       Palpitations     Had some pounding yesterday at rest          Uvaldo Chacko preferred language for health care discussion is english/other. Is someone accompanying this pt? Yes,      Is the patient using any DME equipment during [address]? Wheelchair but can walk    Depression Screening:  3 most recent PHQ Screens 11/17/2022   Little interest or pleasure in doing things Not at all   Feeling down, depressed, irritable, or hopeless Not at all   Total Score PHQ 2 0       Learning Assessment:  Learning Assessment 11/12/2021   PRIMARY LEARNER Patient   PRIMARY LANGUAGE ENGLISH   LEARNER PREFERENCE PRIMARY DEMONSTRATION   ANSWERED BY Patient   RELATIONSHIP SELF       Abuse Screening:  Abuse Screening Questionnaire 11/17/2022   Do you ever feel afraid of your partner? N   Are you in a relationship with someone who physically or mentally threatens you? N   Is it safe for you to go home? Y       Fall Risk  No flowsheet data found. Pt currently taking Anticoagulant therapy? no    Coordination of Care:  1. Have you been to the ER, urgent care clinic since your last visit? Hospitalized since your last visit? no    2. Have you seen or consulted any other health care providers outside of the [address] [place] [address] since your last visit? Include any pap smears or colon screening.  no

## 2022-11-17 NOTE — LETTER
11/17/2022 11:13 AM    Ms. Shmuel Hoytshawna [id number]    To Whom it May Concern,    Please excuse . Anisa Zee from her water therapy class today as her blood pressure was very elevated when she was seen at our office.     Sincerely,      Jacque Hernandez NP

## 2022-11-18 ENCOUNTER — HOSPITAL ENCOUNTER (OUTPATIENT)
Dept: LAB | Age: 62
Discharge: HOME | End: 2022-11-18
Payer: MEDICARE

## 2022-11-18 DIAGNOSIS — I10 HTN, GOAL BELOW 140/90: ICD-10-CM

## 2022-11-18 LAB
ANION GAP SERPL CALC-SCNC: 6 MMOL/L (ref 3–18)
BUN SERPL-MCNC: 24 MG/DL (ref 7–18)
BUN/CREAT SERPL: 30 (ref 12–20)
CALCIUM SERPL-MCNC: 9 MG/DL (ref 8.5–10.1)
CHLORIDE SERPL-SCNC: 107 MMOL/L (ref 100–111)
CO2 SERPL-SCNC: 28 MMOL/L (ref 21–32)
CREAT SERPL-MCNC: 0.79 MG/DL (ref 0.6–1.3)
GLUCOSE SERPL-MCNC: 112 MG/DL (ref 74–99)
POTASSIUM SERPL-SCNC: 4 MMOL/L (ref 3.5–5.5)
SODIUM SERPL-SCNC: 141 MMOL/L (ref 136–145)

## 2022-11-18 PROCEDURE — 36415 COLL VENOUS BLD VENIPUNCTURE: CPT

## 2022-11-18 PROCEDURE — 80048 BASIC METABOLIC PNL TOTAL CA: CPT

## 2022-11-22 ENCOUNTER — HOSPITAL ENCOUNTER (OUTPATIENT)
Dept: PHYSICAL THERAPY | Age: 62
Discharge: HOME | End: 2022-11-22
Payer: MEDICARE

## 2022-11-22 PROCEDURE — 97113 AQUATIC THERAPY/EXERCISES: CPT

## 2022-11-29 ENCOUNTER — HOSPITAL ENCOUNTER (OUTPATIENT)
Dept: PHYSICAL THERAPY | Age: 62
Discharge: HOME | End: 2022-11-29
Payer: MEDICARE

## 2022-11-29 PROCEDURE — 97113 AQUATIC THERAPY/EXERCISES: CPT

## 2022-11-29 NOTE — PROGRESS NOTES
[facility] - [place] [address] [address]  [phone] fax    Continued Plan of Care/ Re-certification for Physical Therapy Services      Patient name: Colleen Acevedo Start of Care: 10/31/2022   Referral source: Isela Sanchez MD : 1960   Medical/Treatment Diagnosis: Pain in right knee [M25.561]  Pain in left knee [M25.562]  Unilateral primary osteoarthritis, left knee [M17.12]  Unilateral primary osteoarthritis, right knee [M17.11]  Payor: [place] MEDICARE / Plan: VA MEDICARE PART A & B / Product Type: Medicare /  Onset Date:10/26/2022  (Script)   Prior Hospitalization: see medical history Provider#: [id number]   Medications: Verified on Patient Summary List    Comorbidities: Angina; Arthritis; Asthma; Back pain; BMI > 30; hx Breast CA - on chemo pill; DM Type II; Headaches; Heart disease; HTN; Multiple Sclerosis; Osteoporosis; hx CVA  Prior Level of Function: Retired; lives in [facility] with spouse; use wheelchairs mainly, Rollator in home, community  Visits from [place] of Care: 7    Missed Visits: 0    The Plan of Care and following information is based on the patient's current status:  Short Term Goals: To be accomplished in 1 weeks:  Goal: Pt to be compliant with initial HEP to improve functional LE strength and stability for ease of transfers, ADLs. Status at last note/certification: Established and reviewed with Pt  Current: MET  Goal: Pt to initiate aquatics program without increased pain to aid in achievement of all LTGs. Status at last note/certification: N/A  Current: MET  Long Term Goals: To be accomplished in 5 weeks:  Goal: Pt to perform at least 6 sit to stands in 30 seconds without UE support or fatigue to show improved LE strength and endurance. Status at last note/certification: 3 x with B UE support, FWD head/trunk, bent knee posture  Current: progressing: 3x with Bilateral UE support and upright torso.  (2022)  Goal: Pt to increase standing/amb tolerance to 10 minutes without deviations for increased independence with bathing, dressing, light chore tasks. Status at last note/certification: 4 minute tolerance with leaning over Rollator  Current: progressin min standing tolerance with rollator (2022)  Goal: Pt to report < 5/10 pain at worst to increase ease with ADLs. Status at last note/certification: 42/14 pain at worst  Current: progressin/10 pain at worst (2022)  Goal: Pt to report FOTO score of 38 pts to show improved function and quality of life. Status at last note/certification: FOTO 33 pts   Current: not met:  FOTO 22, but pts reports no decreases in function (2022)    Key functional changes: Pt has attended 7 sessions of skilled therapy and is making progress towards her LTGs. Her functional gains include improved standing/ walking tolerance with rollator, greater ease with light ADLs and decreased symptoms with reaching overhead. Pt states she is now able to  cloths off of the floor when doing laundry with little to no increases in bilateral knee pain. Functional deficits include inability to ambulate without her rollator and difficulty negotiating stairs. Pt's max pain level is a 6/10, at night or early in the morning. Her best pain level is a 3/10, at rest. She gives herself a 70 percent self reported improvement since starting therapy. Continued aqua therapy recommended to address her current functional deficits. Problems/ barriers to goal attainment: exercises per chart.       Problem List: pain affecting function, decrease ROM, decrease strength, edema affecting function, impaired gait/ balance, decrease ADL/ functional abilitiies, decrease activity tolerance, decrease flexibility/ joint mobility, and decrease transfer abilities    Treatment Plan: Therapeutic exercise, Neuromuscular reeducation, Manual therapy, Therapeutic activity, Self care/home management, Electric stim unattended , Vasopneumatic device, Aquatic therapy, Gait training, Ultrasound, Mechanical traction, and Electric stim attended     Patient Goal (s) has been updated and includes: \"I want to be able to get up and down the stairs to the pool\"     Goals for this certification period to be accomplished in 5 weeks:  Goal: Pt to perform at least 6 sit to stands in 30 seconds without UE support or fatigue to show improved LE strength and endurance. Status at last note/certification: progressing: 3x with Bilateral UE support and upright torso. (2022)  Current:  Goal: Pt to increase standing/amb tolerance to 10 minutes without deviations for increased independence with bathing, dressing, light chore tasks. Status at last note/certification: progressin min standing tolerance with rollator (2022)  Current:   Goal: Pt to report < 5/10 pain at worst to increase ease with ADLs. Status at last note/certification: progressin/10 pain at worst (2022)  Current:  Goal: Pt to report FOTO score of 38 pts to show improved function and quality of life. Status at last note/certification: not met:  FOTO 22, but pt reports no decreases in function noted. (2022)  Current:    Frequency / Duration: Patient to be seen 2 times per week for 5 weeks:    Assessment / Recommendations:Continued aqua therapy recommended  to improve functional LE strength and normalize gait pattern to allow for increased independence/ safety when ambulating in community. Certification Period: 2022- 2022    Giacomo Aguirre, PT 2022 2:18 PM    ________________________________________________________________________  I certify that the above Therapy Services are being furnished while the patient is under my care. I agree with the treatment plan and certify that this therapy is necessary. [] I have read the above and request that my patient continue as recommended.   [] I have read the above report and request that my patient continue therapy with the following changes/special instructions: ______________________________________  [] I have read the above report and request that my patient be discharged from therapy    Physician's Signature:____________Date:_________TIME:________     Kiana Herrera MD  ** Signature, Date and Time must be completed for valid certification **    Please sign and return to [facility] - [address]  [phone] fax

## 2022-12-01 ENCOUNTER — HOSPITAL ENCOUNTER (OUTPATIENT)
Dept: PHYSICAL THERAPY | Age: 62
Discharge: HOME | End: 2022-12-01
Payer: MEDICARE

## 2022-12-01 PROCEDURE — 97113 AQUATIC THERAPY/EXERCISES: CPT

## 2023-01-04 NOTE — PROGRESS NOTES
[facility] - [place] [address] [address]  [phone] fax    Discharge Summary    Patient name: Shahzad Flannery Start of Care: 10/31/2022   Referral source: Gabriella Sheehan MD : 1960   Medical/Treatment Diagnosis: Pain in right knee [M25.561]  Pain in left knee [M25.562]  Unilateral primary osteoarthritis, left knee [M17.12]  Unilateral primary osteoarthritis, right knee [M17.11]  Payor: VA MEDICARE / Plan: VA MEDICARE PART A & B / Product Type: Medicare /  Onset Date:10/26/2022  (Script)     Prior Hospitalization: see medical history Provider#: [id number]   Medications: Verified on Patient Summary List    Comorbidities: Angina; Arthritis; Asthma; Back pain; BMI > 30; hx Breast CA - on chemo pill; DM Type II; Headaches; Heart disease; HTN; Multiple Sclerosis; Osteoporosis; hx CVA  Prior Level of Function:Retired; lives in [facility] with [facility]; use wheelchairs mainly, Rollator in home, community    Visits from [place] of Care: 8    Missed Visits: 0    Reporting Period : 10/31/2022 to 2022    Goal: Pt to perform at least 6 sit to stands in 30 seconds without UE support or fatigue to show improved LE strength and endurance. Status at last note/certification: progressing: 3x with Bilateral UE support and upright torso. (2022)  Current: Unable to assess (2023)  Goal: Pt to increase standing/amb tolerance to 10 minutes without deviations for increased independence with bathing, dressing, light chore tasks. Status at last note/certification: progressin min standing tolerance with rollator (2022)  Current: Unable to assess (2023)  Goal: Pt to report < 5/10 pain at worst to increase ease with ADLs. Status at last note/certification: progressin/10 pain at worst (2022)  Current: Unable to assess (2023)  Goal: Pt to report FOTO score of 38 pts to show improved function and quality of life.   Status at last note/certification: not met:  FOTO 22, but pt reports no decreases in function noted. (11/29/2022)  Current: Unable to assess (1/5/2023)    Assessment/ Summary of Care: Pt attended 8 visits, then discontinued therapy. Pt was contacted via phone and instructed to schedule more appointments. Pt informed the clinician that she was instructed to wear a heart rate monitor at all times, as per her cardiologist. As a result she is now unable to perform aqua therapy, due to her restrictions. Thus, Pt's goals were unable to be further reassessed. Due to clinic attendance policy, Pt will be discharged at this time with no further instructions.   Thank you for this referral.      RECOMMENDATIONS:  [x]Discontinue therapy: [x]Patient has reached or is progressing toward set goals      []Patient is non-compliant or has abdicated      []Due to lack of appreciable progress towards set goals    Nola Singh, PT 1/4/2023 11:04 AM

## 2023-01-11 ENCOUNTER — OFFICE VISIT (OUTPATIENT)
Dept: ORTHOPEDIC SURGERY | Age: 63
End: 2023-01-11
Payer: MEDICARE

## 2023-01-11 VITALS
BODY MASS INDEX: 46.77 KG/M2 | WEIGHT: 232 LBS | HEIGHT: 59 IN | TEMPERATURE: 97.8 F | HEART RATE: 88 BPM | OXYGEN SATURATION: 97 %

## 2023-01-11 DIAGNOSIS — M54.16 LEFT LUMBAR RADICULOPATHY: Primary | ICD-10-CM

## 2023-01-11 DIAGNOSIS — M47.816 LUMBAR SPONDYLOSIS: ICD-10-CM

## 2023-01-11 PROCEDURE — G8427 DOCREV CUR MEDS BY ELIG CLIN: HCPCS | Performed by: PHYSICAL MEDICINE & REHABILITATION

## 2023-01-11 PROCEDURE — G8510 SCR DEP NEG, NO PLAN REQD: HCPCS | Performed by: PHYSICAL MEDICINE & REHABILITATION

## 2023-01-11 PROCEDURE — G8417 CALC BMI ABV UP PARAM F/U: HCPCS | Performed by: PHYSICAL MEDICINE & REHABILITATION

## 2023-01-11 PROCEDURE — 99213 OFFICE O/P EST LOW 20 MIN: CPT | Performed by: PHYSICAL MEDICINE & REHABILITATION

## 2023-01-11 PROCEDURE — 3017F COLORECTAL CA SCREEN DOC REV: CPT | Performed by: PHYSICAL MEDICINE & REHABILITATION

## 2023-01-11 NOTE — PROGRESS NOTES
Franky Ross presents today for   Chief Complaint   Patient presents with    Back Pain     Left lower      Leg Pain     Left         Is someone accompanying this pt? Yes, male    Is the patient using any DME equipment during OV? Yes, wheelchair    Depression Screening:  3 most recent PHQ Screens 1/11/2023   Little interest or pleasure in doing things Not at all   Feeling down, depressed, irritable, or hopeless Not at all   Total Score PHQ 2 0       Learning Assessment:  Learning Assessment 11/12/2021   PRIMARY LEARNER Patient   PRIMARY LANGUAGE ENGLISH   LEARNER PREFERENCE PRIMARY DEMONSTRATION   ANSWERED BY Patient   RELATIONSHIP SELF       Abuse Screening:  Abuse Screening Questionnaire 11/17/2022   Do you ever feel afraid of your partner? N   Are you in a relationship with someone who physically or mentally threatens you? N   Is it safe for you to go home? Y       Fall Risk  Fall Risk Assessment, last 12 mths 1/11/2023   Able to walk? Yes   Fall in past 12 months? 0   Do you feel unsteady? 0   Are you worried about falling 0       Coordination of Care:  1. Have you been to the ER, urgent care clinic since your last visit? no  Hospitalized since your last visit? no    2. Have you seen or consulted any other health care providers outside of the [address] [place] [address] since your last visit? Yes, ortho, oncology, cardiology, physical therapy  Include any pap smears or colon screening.  no

## 2023-01-11 NOTE — PROGRESS NOTES
[address]  [address]  [place], [address] [place] [address] Street  Phone: [phone]  Fax: [phone]      Patient: Gregg Rojas                                                                              MRN: [id number]        YOB: 1960          AGE: 60 y.o. PCP: Coletta Litten, DO  Date:  01/11/23    Reason for Consultation: Back Pain (Left lower/) and Leg Pain (Left/)      HPI:  Gregg Rojas is a 60 y.o. 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 She has previously lived in [place] and was followed by Dr. Rosario Johansen at [facility]- on lyrica and tried PT and L5-S1 IL UMESH.  7/2020 she had covid and while undergoing evaluation she as found to have left breast cancer. She is undergoing treatment at [facility] and has completed chemotherapy and radiation. Her most recent MRI lumbar spine with l5/S1 disc ulge and moderate R>L foraminal narrowing and contact on b/l S1 nerve roots. 1/18/22 she had a right S1 SNRB which gave her great relief for over 6 months. She returns with similar symptoms radiating down her left leg. Also reports chronic right knee pain related to arthritis, and b/l hip pain. Sees Dr. Holden Poole recently completed euflexxa injections      Neurologic symptoms: No numbness, tingling, weakness, bowel or bladder changes. No recent falls in past 6 months, since her breast CA and radiation she has limited walking tolerance and is using a wheelchair. Location: The pain is located in the low back (left) , also left groin pain /left buttock pain  Radiation: The pain does radiate down the right leg towards the ankle posteriorly   Pain Score: Currently: 7/10    Quality: Pain is of a Stabbing and Pulling quality.     Aggravating: Pain is exacerbated by sitting, standing and lying down  Alleviating: The pain is alleviated by nothing    Prior Treatments:   Dr. Nicola Boudreaux -ESIs- last early 2020 1/18/22- Right S1 SNRB- Dr. Alison Mejia great relief x 6 months   - b/l knee euflexxa injections Dr. Ana Rogers 11/2022  Physical therapy- completed Aquatic therapy 11/2022  Previous Medications:   Current Medications:lyrica 100mg, 100mg, 200mg, meloxicam, on dexamethasone 4mg q12hr  Previous work-up has included:   Per records    MRI lumbar spine 4/22/2021  For the purposes of this dictation, the lowest well formed intervertebral disc space is assumed to be the L5-S1 level, and there are presumed to be five lumbar-type vertebral bodies. Straightening of normal lumbar lordosis. Vertebral body heights are maintained. Signal intensity throughout the bone marrow is within normal limits. No suspicious osseous lesion, acute fracture line, or bone marrow edema. The conus medullaris ends at a normal level. L1-L2: Circumferential disc bulge and tiny superimposed left subarticular protrusion without significant spinal canal stenosis or neural foraminal narrowing. L2-L3: Circumferential disc bulge and mild facet arthrosis. No significant spinal canal stenosis or neural foraminal narrowing. L3-L4: Diffuse disc bulge with mild ligamentum flavum thickening and facet arthrosis. No significant spinal canal stenosis or neural foraminal narrowing. L4-L5: Diffuse disc bulge with facet arthropathy bilaterally. No significant canal stenosis. Mild bilateral neural foraminal narrowing. L5-S1: Posterior disc bulge contacting the transiting S1 nerve roots bilaterally. Bilateral facet arthropathy. Moderate right greater than left neural foraminal narrowing.         Past Medical History:   Past Medical History:   Diagnosis Date    Anemia     Arthritis     Asthma     Well controlled    Autoimmune disease ([address]     Back pain     Breast cancer ([address] 08/2020    left     Cervical cancer ([address] 11/09/2013 Diabetes [address]     NIDDM    GERD (gastroesophageal reflux disease)     Heart failure (HCC)     h/o CVA right sided weakness per cardiac note 11/2021 cc    High cholesterol     Hypertension     Lumbar herniated disc     L4/L5, S1/S2    MI (mitral incompetence) 2002    MI (myocardial infarction) ([facility] 2000, 2009    MS (multiple sclerosis) ([facility]     Right knee pain     Sleep apnea     CPAP    Thromboembolism of deep veins of lower extremity, left ([facility] 2002    Unexplained weight gain     Wheelchair dependent 2006    MS      Past Surgical History:   Past Surgical History:   Procedure Laterality Date    COLONOSCOPY N/A 2/7/2022    COLONOSCOPY with Polypectomies performed by Sally José MD at [address]  09/04/2020    Minor blockages    HX HYSTERECTOMY  11/06/2013    HX MASTECTOMY Left 12/07/2020    HX OOPHORECTOMY  11/06/2013      SocHx:   Social History     Tobacco Use    Smoking status: Never    Smokeless tobacco: Never   Substance Use Topics    Alcohol use: Never      FamHx:? No family history on file. Current Medications:    Current Outpatient Medications   Medication Sig Dispense Refill    Blood Pressure Kit-Extra Large kit 1 Kit by Does Not Apply route daily as needed (HTN). 1 Kit 0    lidocaine (LIDODERM) 5 % Apply patch to the bilateral knees for 12 hours a day and remove for 12 hours a day. 60 Patch 2    benazepriL (LOTENSIN) 40 mg tablet Take 1 Tablet by mouth daily. 90 Tablet 3    amLODIPine (Norvasc) 10 mg tablet Take 1 Tablet by mouth daily. 90 Tablet 3    atorvastatin (Lipitor) 40 mg tablet Take 1 Tablet by mouth daily. 90 Tablet 3    aspirin delayed-release 81 mg tablet Take 1 Tablet by mouth daily. 90 Tablet 3    chlorthalidone (HYGROTON) 25 mg tablet Take 1 Tablet by mouth daily. 90 Tablet 3    spironolactone (Aldactone) 50 mg tablet Take 1 Tablet by mouth daily. 90 Tablet 3    potassium chloride SR (Klor-Con 10) 10 mEq tablet Take 1 Tablet by mouth daily.  80 Tablet 2    carvediloL (COREG) 25 mg tablet Take 1 Tablet by mouth two (2) times daily (with meals). 60 Tablet 6    pregabalin (LYRICA) 100 mg capsule Take 100 mg by mouth two (2) times a day. diazePAM (VALIUM) 5 mg tablet Take 5 mg by mouth as needed. Pre Procedure      eszopiclone (LUNESTA) 3 mg tablet Take 3 mg by mouth nightly. ibuprofen (MOTRIN) 800 mg tablet Take 800 mg by mouth every six (6) hours as needed. promethazine (PHENERGAN) 25 mg tablet Take 25 mg by mouth every six (6) hours as needed. pregabalin (LYRICA) 200 mg capsule Take 200 mg by mouth nightly. anastrozole (ARIMIDEX) 1 mg tablet Take 1 mg by mouth daily. albuterol (PROVENTIL HFA, VENTOLIN HFA, PROAIR HFA) 90 mcg/actuation inhaler Take 1 Puff by inhalation every six (6) hours as needed for Wheezing. acetaminophen (TYLENOL) 325 mg tablet Take 650 mg by mouth every six (6) hours as needed for Pain. mineral oil-hydrophil petrolat (Aquaphor) ointment Apply  to affected area as needed for Dry Skin. beclomethasone (Qvar) 40 mcg/actuation aero Take 1 Puff by inhalation two (2) times a day. cholecalciferol (VITAMIN D3) (1000 Units /25 mcg) tablet Take 2,000 Units by mouth daily. dexAMETHasone (DECADRON) 4 mg tablet Take 4 mg by mouth every twelve (12) hours. diclofenac (VOLTAREN) 1 % gel Apply 2 g to affected area four (4) times daily. folic acid (FOLVITE) 1 mg tablet Take 1 mg by mouth daily. interferon beta-1a (AVONEX) 30 mcg/0.5 mL injection 30 mcg by IntraMUSCular route every seven (7) days. melatonin 3 mg tablet Take 3 mg by mouth nightly as needed for Insomnia.      meloxicam (MOBIC) 7.5 mg tablet Take 7.5 mg by mouth daily as needed. metFORMIN (GLUCOPHAGE) 500 mg tablet Take 500 mg by mouth two (2) times daily (with meals). mometasone (Asmanex Twisthaler) 220 mcg/ actuation (14) inhaler Take 1 Puff by inhalation daily.       omeprazole (PRILOSEC OTC) 20 mg tablet Take 20 mg by mouth daily. ondansetron hcl (ZOFRAN) 8 mg tablet Take 8 mg by mouth every eight (8) hours as needed for Nausea or Vomiting. prochlorperazine (COMPAZINE) 5 mg tablet Take 5 mg by mouth every six (6) hours as needed for Nausea or Vomiting.      trospium (SANCTURA) 20 mg tablet Take 20 mg by mouth Before breakfast, lunch, and dinner. Allergies: Allergies   Allergen Reactions    Penicillin G Hives and Rash        Review of Systems:   Gen:    Denied fevers, chills, malaise, fatigue, weight changes   Resp: Denied shortness of breath, cough, wheezing   CVS: Denied chest pain, palpitations   : Denied urinary urgency, frequency, incontinence   GI: Denied nausea, vomiting, constipation, diarrhea   Skin: Denied rashes, wounds   Psych: Denied anxiety, depression   Vasc: Denied claudication, ulcers   Hem: Denied easy bruising/bleeding   MSK: See HPI   Neuro: See HPI         Physical Exam     Vital Signs:   Visit Vitals  Pulse 88   Temp 97.8 °F (36.6 °C) (Temporal)   Ht 4' 11\" (1.499 m)   Wt 232 lb (105.2 kg)   SpO2 97% Comment: RA   BMI 46.86 kg/m²      General: ??????? Well nourished and well developed female without any acute distress   Psychiatric: ?  Alert and oriented x 3 with normal mood    HEENT: ???????? Atraumatic   Respiratory:   Breathing non-labored and non dyspneic   CV: ???????????????? Peripheral pulses intact, no peripheral edema   Skin: ????????????? No rashes       Neurologic: ??       Sensation: normal and grossly intact thebilateral, lower extremity(s)   Strength: 5/5 in the bilateral, lower extremity(s) b/l HF 4/5, right KE limited by pain  Reflexes: reveals 2+ symmetric DTRs throughout patella  Gait: unsteady, antalgic        Pain with lumbar flexion and extension  Tender throughout lumbar paraspinals  Tender L>R gluteal and SI joint  + left seated slump test  Pain with FADIR b/l  Negative log roll  + stinchfield b/l   Pain with HUI       Medical Decision Making: Images: The imaging results as well as the actual images of the studies below were reviewed and visualized. Labs: The results below were reviewed. Records reviewed from [facility] Dr. Lupe Clarke    Assessment:   -low back pain-left lumbar radiculopathy S1  -right knee oa  -b/l hip pain    Plan:      -Physical therapy -  continue HEP   -Medications continue current medications-Lyrica 100, 100, 200mg Counseled regarding side effects and appropriate administration of medications.    -Diagnostics/Imaging -NA  -Injections -referral for leftS1 SNRB, if no relief consider L4/5 Il UMESH which has helped in the past   -Education - The patient's diagnosis, prognosis and treatment options were discussed today. All questions were answered.    -Coordination of Care- Reviewed prior records from [facility]  F/U -after [address] [facility]        Total time spent with patient:  20 mins for today's visit was devoted to face-to-face counseling regarding the following:  Discussed diagnosis, treatment options, and risks and benefits of treatment          ?

## 2023-01-11 NOTE — H&P (VIEW-ONLY)
[address]  [address], [address]  [place], [address] [place] [address] Street  Phone: [phone]  Fax: [phone]      Patient: Luis Reeves                                                                              MRN: [id number]        YOB: 1960          AGE: 60 y.o. PCP: Vida Sims, DO  Date:  01/11/23    Reason for Consultation: Back Pain (Left lower/) and Leg Pain (Left/)      HPI:  Luis Reeves is a 60 y.o. 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 She has previously lived in [place] and was followed by Dr. Sherry Jean at [facility]- on lyrica and tried PT and L5-S1 IL UMESH.  7/2020 she had covid and while undergoing evaluation she as found to have left breast cancer. She is undergoing treatment at [facility] and has completed chemotherapy and radiation. Her most recent MRI lumbar spine with l5/S1 disc ulge and moderate R>L foraminal narrowing and contact on b/l S1 nerve roots. 1/18/22 she had a right S1 SNRB which gave her great relief for over 6 months. She returns with similar symptoms radiating down her left leg. Also reports chronic right knee pain related to arthritis, and b/l hip pain. Sees Dr. Socorro Waller recently completed euflexxa injections      Neurologic symptoms: No numbness, tingling, weakness, bowel or bladder changes. No recent falls in past 6 months, since her breast CA and radiation she has limited walking tolerance and is using a wheelchair. Location: The pain is located in the low back (left) , also left groin pain /left buttock pain  Radiation: The pain does radiate down the right leg towards the ankle posteriorly   Pain Score: Currently: 7/10    Quality: Pain is of a Stabbing and Pulling quality.     Aggravating: Pain is exacerbated by sitting, standing and lying down  Alleviating: The pain is alleviated by nothing    Prior Treatments:   Dr. Farrah Cooper -ESIs- last early 2020 1/18/22- Right S1 SNRB- Dr. Jose Angel Maldonado great relief x 6 months   - b/l knee euflexxa injections Dr. Chrissy Mak 11/2022  Physical therapy- completed Aquatic therapy 11/2022  Previous Medications:   Current Medications:lyrica 100mg, 100mg, 200mg, meloxicam, on dexamethasone 4mg q12hr  Previous work-up has included:   Per records    MRI lumbar spine 4/22/2021  For the purposes of this dictation, the lowest well formed intervertebral disc space is assumed to be the L5-S1 level, and there are presumed to be five lumbar-type vertebral bodies. Straightening of normal lumbar lordosis. Vertebral body heights are maintained. Signal intensity throughout the bone marrow is within normal limits. No suspicious osseous lesion, acute fracture line, or bone marrow edema. The conus medullaris ends at a normal level. L1-L2: Circumferential disc bulge and tiny superimposed left subarticular protrusion without significant spinal canal stenosis or neural foraminal narrowing. L2-L3: Circumferential disc bulge and mild facet arthrosis. No significant spinal canal stenosis or neural foraminal narrowing. L3-L4: Diffuse disc bulge with mild ligamentum flavum thickening and facet arthrosis. No significant spinal canal stenosis or neural foraminal narrowing. L4-L5: Diffuse disc bulge with facet arthropathy bilaterally. No significant canal stenosis. Mild bilateral neural foraminal narrowing. L5-S1: Posterior disc bulge contacting the transiting S1 nerve roots bilaterally. Bilateral facet arthropathy. Moderate right greater than left neural foraminal narrowing.         Past Medical History:   Past Medical History:   Diagnosis Date    Anemia     Arthritis     Asthma     Well controlled    Autoimmune disease ([address]     Back pain     Breast cancer ([facility] 08/2020    left     Cervical cancer ([facility] 11/09/2013 Diabetes ([facility] [address]     NIDDM    GERD (gastroesophageal reflux disease)     Heart failure (HCC)     h/o CVA right sided weakness per cardiac note 11/2021 cc    High cholesterol     Hypertension     Lumbar herniated disc     L4/L5, S1/S2    MI (mitral incompetence) 2002    MI (myocardial infarction) ([facility] [address] 2000, 2009    MS (multiple sclerosis) ([facility]     Right knee pain     Sleep apnea     CPAP    Thromboembolism of deep veins of lower extremity, left ([facility] 2002    Unexplained weight gain     Wheelchair dependent 2006    MS      Past Surgical History:   Past Surgical History:   Procedure Laterality Date    COLONOSCOPY N/A 2/7/2022    COLONOSCOPY with Polypectomies performed by Tessa Villa MD at [address]  09/04/2020    Minor blockages    HX HYSTERECTOMY  11/06/2013    HX MASTECTOMY Left 12/07/2020    HX OOPHORECTOMY  11/06/2013      SocHx:   Social History     Tobacco Use    Smoking status: Never    Smokeless tobacco: Never   Substance Use Topics    Alcohol use: Never      FamHx:? No family history on file. Current Medications:    Current Outpatient Medications   Medication Sig Dispense Refill    Blood Pressure Kit-Extra Large kit 1 Kit by Does Not Apply route daily as needed (HTN). 1 Kit 0    lidocaine (LIDODERM) 5 % Apply patch to the bilateral knees for 12 hours a day and remove for 12 hours a day. 60 Patch 2    benazepriL (LOTENSIN) 40 mg tablet Take 1 Tablet by mouth daily. 90 Tablet 3    amLODIPine (Norvasc) 10 mg tablet Take 1 Tablet by mouth daily. 90 Tablet 3    atorvastatin (Lipitor) 40 mg tablet Take 1 Tablet by mouth daily. 90 Tablet 3    aspirin delayed-release 81 mg tablet Take 1 Tablet by mouth daily. 90 Tablet 3    chlorthalidone (HYGROTON) 25 mg tablet Take 1 Tablet by mouth daily. 90 Tablet 3    spironolactone (Aldactone) 50 mg tablet Take 1 Tablet by mouth daily. 90 Tablet 3    potassium chloride SR (Klor-Con 10) 10 mEq tablet Take 1 Tablet by mouth daily.  80 Tablet 2    carvediloL (COREG) 25 mg tablet Take 1 Tablet by mouth two (2) times daily (with meals). 60 Tablet 6    pregabalin (LYRICA) 100 mg capsule Take 100 mg by mouth two (2) times a day. diazePAM (VALIUM) 5 mg tablet Take 5 mg by mouth as needed. Pre Procedure      eszopiclone (LUNESTA) 3 mg tablet Take 3 mg by mouth nightly. ibuprofen (MOTRIN) 800 mg tablet Take 800 mg by mouth every six (6) hours as needed. promethazine (PHENERGAN) 25 mg tablet Take 25 mg by mouth every six (6) hours as needed. pregabalin (LYRICA) 200 mg capsule Take 200 mg by mouth nightly. anastrozole (ARIMIDEX) 1 mg tablet Take 1 mg by mouth daily. albuterol (PROVENTIL HFA, VENTOLIN HFA, PROAIR HFA) 90 mcg/actuation inhaler Take 1 Puff by inhalation every six (6) hours as needed for Wheezing. acetaminophen (TYLENOL) 325 mg tablet Take 650 mg by mouth every six (6) hours as needed for Pain. mineral oil-hydrophil petrolat (Aquaphor) ointment Apply  to affected area as needed for Dry Skin. beclomethasone (Qvar) 40 mcg/actuation aero Take 1 Puff by inhalation two (2) times a day. cholecalciferol (VITAMIN D3) (1000 Units /25 mcg) tablet Take 2,000 Units by mouth daily. dexAMETHasone (DECADRON) 4 mg tablet Take 4 mg by mouth every twelve (12) hours. diclofenac (VOLTAREN) 1 % gel Apply 2 g to affected area four (4) times daily. folic acid (FOLVITE) 1 mg tablet Take 1 mg by mouth daily. interferon beta-1a (AVONEX) 30 mcg/0.5 mL injection 30 mcg by IntraMUSCular route every seven (7) days. melatonin 3 mg tablet Take 3 mg by mouth nightly as needed for Insomnia.      meloxicam (MOBIC) 7.5 mg tablet Take 7.5 mg by mouth daily as needed. metFORMIN (GLUCOPHAGE) 500 mg tablet Take 500 mg by mouth two (2) times daily (with meals). mometasone (Asmanex Twisthaler) 220 mcg/ actuation (14) inhaler Take 1 Puff by inhalation daily.       omeprazole (PRILOSEC OTC) 20 mg tablet Take 20 mg by mouth daily. ondansetron hcl (ZOFRAN) 8 mg tablet Take 8 mg by mouth every eight (8) hours as needed for Nausea or Vomiting. prochlorperazine (COMPAZINE) 5 mg tablet Take 5 mg by mouth every six (6) hours as needed for Nausea or Vomiting.      trospium (SANCTURA) 20 mg tablet Take 20 mg by mouth Before breakfast, lunch, and dinner. Allergies: Allergies   Allergen Reactions    Penicillin G Hives and Rash        Review of Systems:   Gen:    Denied fevers, chills, malaise, fatigue, weight changes   Resp: Denied shortness of breath, cough, wheezing   CVS: Denied chest pain, palpitations   : Denied urinary urgency, frequency, incontinence   GI: Denied nausea, vomiting, constipation, diarrhea   Skin: Denied rashes, wounds   Psych: Denied anxiety, depression   Vasc: Denied claudication, ulcers   Hem: Denied easy bruising/bleeding   MSK: See HPI   Neuro: See HPI         Physical Exam     Vital Signs:   Visit Vitals  Pulse 88   Temp 97.8 °F (36.6 °C) (Temporal)   Ht 4' 11\" (1.499 m)   Wt 232 lb (105.2 kg)   SpO2 97% Comment: RA   BMI 46.86 kg/m²      General: ??????? Well nourished and well developed female without any acute distress   Psychiatric: ?  Alert and oriented x 3 with normal mood    HEENT: ???????? Atraumatic   Respiratory:   Breathing non-labored and non dyspneic   CV: ???????????????? Peripheral pulses intact, no peripheral edema   Skin: ????????????? No rashes       Neurologic: ??       Sensation: normal and grossly intact thebilateral, lower extremity(s)   Strength: 5/5 in the bilateral, lower extremity(s) b/l HF 4/5, right KE limited by pain  Reflexes: reveals 2+ symmetric DTRs throughout patella  Gait: unsteady, antalgic        Pain with lumbar flexion and extension  Tender throughout lumbar paraspinals  Tender L>R gluteal and SI joint  + left seated slump test  Pain with FADIR b/l  Negative log roll  + stinchfield b/l   Pain with HUI       Medical Decision Making: Images: The imaging results as well as the actual images of the studies below were reviewed and visualized. Labs: The results below were reviewed. Records reviewed from [address]    Assessment:   -low back pain-left lumbar radiculopathy S1  -right knee oa  -b/l hip pain    Plan:      -Physical therapy -  continue HEP   -Medications continue current medications-Lyrica 100, 100, 200mg Counseled regarding side effects and appropriate administration of medications.    -Diagnostics/Imaging -NA  -Injections -referral for leftS1 SNRB, if no relief consider L4/5 Il UMESH which has helped in the past   -Education - The patient's diagnosis, prognosis and treatment options were discussed today. All questions were answered.    -Coordination of Care- Reviewed prior records from [address]  F/U -after [address] [facility]        Total time spent with patient:  20 mins for today's visit was devoted to face-to-face counseling regarding the following:  Discussed diagnosis, treatment options, and risks and benefits of treatment          ?

## 2023-01-31 ENCOUNTER — APPOINTMENT (OUTPATIENT)
Dept: GENERAL RADIOLOGY | Age: 63
End: 2023-01-31
Attending: PHYSICAL MEDICINE & REHABILITATION
Payer: MEDICARE

## 2023-01-31 ENCOUNTER — HOSPITAL ENCOUNTER (OUTPATIENT)
Age: 63
Setting detail: OUTPATIENT SURGERY
Discharge: HOME | End: 2023-01-31
Attending: PHYSICAL MEDICINE & REHABILITATION | Admitting: PHYSICAL MEDICINE & REHABILITATION
Payer: MEDICARE

## 2023-01-31 VITALS
TEMPERATURE: 98.1 F | HEART RATE: 90 BPM | SYSTOLIC BLOOD PRESSURE: 120 MMHG | DIASTOLIC BLOOD PRESSURE: 81 MMHG | RESPIRATION RATE: 16 BRPM | OXYGEN SATURATION: 95 %

## 2023-01-31 LAB — GLUCOSE BLD STRIP.AUTO-MCNC: 129 MG/DL (ref 70–110)

## 2023-01-31 PROCEDURE — 2709999900 HC NON-CHARGEABLE SUPPLY: Performed by: PHYSICAL MEDICINE & REHABILITATION

## 2023-01-31 PROCEDURE — 74011000250 HC RX REV CODE- 250: Performed by: PHYSICAL MEDICINE & REHABILITATION

## 2023-01-31 PROCEDURE — 74011000636 HC RX REV CODE- 636: Performed by: PHYSICAL MEDICINE & REHABILITATION

## 2023-01-31 PROCEDURE — 77030003672 HC NDL SPN HALY -A: Performed by: PHYSICAL MEDICINE & REHABILITATION

## 2023-01-31 PROCEDURE — 74011250636 HC RX REV CODE- 250/636: Performed by: PHYSICAL MEDICINE & REHABILITATION

## 2023-01-31 PROCEDURE — 77030039433 HC TY MYLEOGRAM BD -B: Performed by: PHYSICAL MEDICINE & REHABILITATION

## 2023-01-31 PROCEDURE — 76010000009 HC PAIN MGT 0 TO 30 MIN PROC: Performed by: PHYSICAL MEDICINE & REHABILITATION

## 2023-01-31 PROCEDURE — 82962 GLUCOSE BLOOD TEST: CPT

## 2023-01-31 PROCEDURE — 74011250637 HC RX REV CODE- 250/637: Performed by: PHYSICAL MEDICINE & REHABILITATION

## 2023-01-31 RX ORDER — LIDOCAINE HYDROCHLORIDE 10 MG/ML
INJECTION, SOLUTION EPIDURAL; INFILTRATION; INTRACAUDAL; PERINEURAL AS NEEDED
Status: DISCONTINUED | OUTPATIENT
Start: 2023-01-31 | End: 2023-01-31 | Stop reason: HOSPADM

## 2023-01-31 RX ORDER — DIAZEPAM 5 MG/1
5-20 TABLET ORAL ONCE
Status: COMPLETED | OUTPATIENT
Start: 2023-01-31 | End: 2023-01-31

## 2023-01-31 RX ORDER — DEXAMETHASONE SODIUM PHOSPHATE 100 MG/10ML
INJECTION INTRAMUSCULAR; INTRAVENOUS AS NEEDED
Status: DISCONTINUED | OUTPATIENT
Start: 2023-01-31 | End: 2023-01-31 | Stop reason: HOSPADM

## 2023-01-31 RX ADMIN — DIAZEPAM 10 MG: 5 TABLET ORAL at 09:35

## 2023-01-31 NOTE — PROCEDURES
SELECTIVE NERVE ROOT BLOCK PROCEDURE NOTE      Patient Name: Aldo Staples  Date of Procedure: January 31, 2023  Preoperative Diagnosis:  Lumbar Radicular Pain  Post Operative Diagnosis:  Lumbar Radicular Pain  Location:  [facility]    Procedure :    left S1 Selective Nerve Root Block      Consent:  Informed consent was obtained prior to the procedure. The patient was given the opportunity to ask questions regarding the procedure and its associated risks. In addition to the potential risks associated with the procedure itself, the patient was informed both verbally and in writing of the potential side effects of the use of glucocorticoid. The patient appeared to comprehend the informed consent and desired to have the procedure performed. Procedure: The patient was placed in the prone position on the fluoroscopy table and the back was prepped and draped in the usual sterile manner. The exact spinal level was  identified using fluoroscopy, and Lidocaine 1 % was injected locally, a 22 gauge spinal needle was passed to the transverse process. The depth was noted and the needle redirected to pass inferior and approximately one cm anterior to the transverse process. YES  1 cc of Isovue M-200 was used to verify positioning in the epidural and paravertebral space and outlined the course of the spinal nerve into the epidural space. The same procedure was repeated at each spinal level indicated above. No vascular uptake was identified. A total of 10 mg of preservative free Dexamethasone and 1 cc of Lidocaine/site was slowly injected. The patient tolerated the procedure well. The injection area was cleaned and bandaids applied. Not excessive bleeding was noted. Patient dressed and discharged to home with instructions. Discussion: The patient tolerated the procedure well. Patient reported tina-procedural pain on Visual Analog Scale:  pre-6; post-0. Brittanie Tabares MD  January 31, 2023

## 2023-01-31 NOTE — PERIOP NOTES
Patient verbalized understanding of discharge instructions and verbally consented to [facility] Patient Consent. Signature pad not working.

## 2023-01-31 NOTE — INTERVAL H&P NOTE
Update History & Physical    The Patient's History and Physical of January 11, 2023 was reviewed. There was no change. The surgical site was confirmed by the patient and me. Plan:  The risk, benefits, expected outcome, and alternative to the recommended procedure have been discussed with the patient. Patient understands and wants to proceed with the procedure.     Electronically signed by Linn Ahumada MD on 1/31/2023 at 10:22 AM

## 2023-03-08 ENCOUNTER — OFFICE VISIT (OUTPATIENT)
Age: 63
End: 2023-03-08
Payer: MEDICARE

## 2023-03-08 VITALS
DIASTOLIC BLOOD PRESSURE: 70 MMHG | TEMPERATURE: 97.5 F | OXYGEN SATURATION: 95 % | HEIGHT: 59 IN | HEART RATE: 71 BPM | RESPIRATION RATE: 18 BRPM | SYSTOLIC BLOOD PRESSURE: 101 MMHG | BODY MASS INDEX: 46.86 KG/M2

## 2023-03-08 DIAGNOSIS — M47.816 SPONDYLOSIS WITHOUT MYELOPATHY OR RADICULOPATHY, LUMBAR REGION: ICD-10-CM

## 2023-03-08 DIAGNOSIS — M54.16 RADICULOPATHY, LUMBAR REGION: Primary | ICD-10-CM

## 2023-03-08 PROCEDURE — 3017F COLORECTAL CA SCREEN DOC REV: CPT | Performed by: PHYSICAL MEDICINE & REHABILITATION

## 2023-03-08 PROCEDURE — G8427 DOCREV CUR MEDS BY ELIG CLIN: HCPCS | Performed by: PHYSICAL MEDICINE & REHABILITATION

## 2023-03-08 PROCEDURE — 99213 OFFICE O/P EST LOW 20 MIN: CPT | Performed by: PHYSICAL MEDICINE & REHABILITATION

## 2023-03-08 PROCEDURE — G8417 CALC BMI ABV UP PARAM F/U: HCPCS | Performed by: PHYSICAL MEDICINE & REHABILITATION

## 2023-03-08 PROCEDURE — G8484 FLU IMMUNIZE NO ADMIN: HCPCS | Performed by: PHYSICAL MEDICINE & REHABILITATION

## 2023-03-08 PROCEDURE — 1036F TOBACCO NON-USER: CPT | Performed by: PHYSICAL MEDICINE & REHABILITATION

## 2023-03-08 RX ORDER — METOPROLOL SUCCINATE 200 MG/1
TABLET, EXTENDED RELEASE ORAL
COMMUNITY

## 2023-03-08 RX ORDER — RANITIDINE 300 MG/1
CAPSULE ORAL
COMMUNITY

## 2023-03-08 RX ORDER — PHENAZOPYRIDINE HYDROCHLORIDE 100 MG/1
TABLET, FILM COATED ORAL
COMMUNITY
Start: 2022-03-28

## 2023-03-08 RX ORDER — LETROZOLE 2.5 MG/1
TABLET, FILM COATED ORAL
COMMUNITY
Start: 2023-01-23 | End: 2024-01-23

## 2023-03-08 RX ORDER — TRIAMCINOLONE ACETONIDE 1 MG/G
CREAM TOPICAL
COMMUNITY
Start: 2022-03-10

## 2023-03-08 RX ORDER — NITROFURANTOIN 25; 75 MG/1; MG/1
CAPSULE ORAL
COMMUNITY
Start: 2022-03-28

## 2023-03-08 RX ORDER — BUTALBITAL, ACETAMINOPHEN AND CAFFEINE 300; 40; 50 MG/1; MG/1; MG/1
CAPSULE ORAL
COMMUNITY
Start: 2022-06-03

## 2023-03-08 RX ORDER — TRAMADOL HYDROCHLORIDE 50 MG/1
TABLET ORAL
COMMUNITY
Start: 2022-09-06

## 2023-03-08 RX ORDER — MECLIZINE HYDROCHLORIDE 25 MG/1
TABLET ORAL 3 TIMES DAILY
COMMUNITY
Start: 2008-11-18

## 2023-03-08 RX ORDER — LACTULOSE 10 G/15ML
SOLUTION ORAL
COMMUNITY
Start: 2021-02-08

## 2023-03-08 RX ORDER — POLYETHYLENE GLYCOL 3350 17 G/17G
POWDER, FOR SOLUTION ORAL
COMMUNITY
Start: 2021-02-08

## 2023-03-08 NOTE — PROGRESS NOTES
[address]   [place], [facility] [address]   Phone: [phone]   Fax: [phone]         Patient: Araceli Jimenez                                                                               MRN: [id number]         YOB: 1960           AGE: 60 y.o. PCP: Yris Tovar DO       Reason for Consultation: Back Pain (Left lower/) and Leg Pain (Left/)         HPI:   Araceli Jimenez is a 60 y.o.  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 She has previously lived in [place] and was followed by Dr. Clayton Brumfield at [facility] and tried PT and L5-S1 IL MIGUEL.  7/2020 she had covid and while undergoing evaluation she as found to have left breast cancer. She is undergoing treatment  at [facility] and has completed chemotherapy and radiation. Her most recent MRI lumbar spine with l5/S1 disc ulge and moderate R>L foraminal narrowing and contact on b/l S1 nerve roots. 1/18/22 she had a right S1 SNRB which gave her great relief for over 6  months. She returns with similar symptoms radiating down her left leg and 1/31/23 she had a left S1 SNRB which gave 75 % relief. Also reports chronic right knee pain related to arthritis, and b/l hip pain. Sees Dr. Alfonso Santamaria recently completed euflexxa injections         Neurologic symptoms: No numbness, tingling, weakness, bowel or bladder changes. No recent falls in past 6 months, since her breast CA and radiation she has limited walking tolerance and is using a wheelchair. Location: The pain is located in the low back   Radiation: The pain does radiate down the right and left  leg towards the ankle posteriorly    Pain Score: Currently: 5/10     Quality: Pain is of a Stabbing and Pulling quality.      Aggravating: Pain is exacerbated by sitting, standing and lying down   Alleviating: The pain is alleviated by nothing      Prior Treatments:    Dr. Deb Martinez -ESIs- last early 2020   1/18/22- Right S1 SNRB- Dr. Kelly Matos great relief x 6 months     1/31/23 - left SNRB Dr. Kelly Matos -75% relief      - b/l knee euflexxa injections Dr. Vargas Wen 11/2022   Physical therapy- completed Aquatic therapy 11/2022   Previous Medications:    Current Medications:lyrica 100mg, 100mg, 200mg, meloxicam, on dexamethasone 4mg q12hr   Previous work-up has included:    Per records      MRI lumbar spine 4/22/2021   For the purposes of this dictation, the lowest well formed intervertebral disc space is assumed to be the L5-S1 level, and there are presumed to be five lumbar-type vertebral bodies. Straightening of normal lumbar lordosis. Vertebral body heights  are maintained. Signal intensity throughout the bone marrow is within normal limits. No suspicious osseous lesion, acute fracture line, or bone marrow edema. The conus medullaris ends at a normal level. L1-L2: Circumferential disc bulge and  tiny superimposed left subarticular protrusion without significant spinal canal stenosis or neural foraminal narrowing. L2-L3: Circumferential disc bulge and mild facet arthrosis. No significant spinal canal stenosis or neural foraminal narrowing. L3-L4: Diffuse disc bulge with mild ligamentum flavum thickening and facet arthrosis. No significant spinal canal stenosis or neural foraminal narrowing. L4-L5: Diffuse disc bulge with facet arthropathy bilaterally. No significant canal stenosis. Mild bilateral neural foraminal narrowing. L5-S1: Posterior disc bulge contacting the transiting S1 nerve roots bilaterally. Bilateral facet arthropathy. Moderate right greater than left neural foraminal narrowing.            Past Medical History:     Past Medical History:   Diagnosis Date    Anemia     Arthritis     Asthma     Well controlled    Autoimmune disease ([facility]     Back pain     Breast cancer ([facility] 08/2020    left     Cerv[facility] ([facility] 11/09/2013    Diabetes ([facility]     NIDDM    GERD (gastroesophageal reflux disease)     Heart failure (HCC)     h/o CVA right sided weakness per cardiac note 11/2021 cc    High cholesterol     Hypertension     Lumbar herniated disc     L4/L5, S1/S2    MI (mitral incompetence) 2002    MI (myocardial infarction) ([facility] 2000, 2009    MS (multiple sclerosis) ([facility]     Right knee pain     Sleep apnea     CPAP    Thromboembolism of deep veins of lower extremity, left ([facility] 2002    Unexplained weight gain     Wheelchair dependent 2006    MS      Past Surgical History:   Procedure Laterality Date    CARDIAC CATHETERIZATION  09/04/2020    Minor blockages    COLONOSCOPY N/A 2/7/2022    COLONOSCOPY with Polypectomies performed by Castillo Lopez MD at [address] [place] [address] ([address] [place] [address])  11/06/2013    MASTECTOMY Left 12/07/2020    OVARY REMOVAL  11/06/2013      Social History     Tobacco Use   Smoking Status Never   Smokeless Tobacco Never      Current Outpatient Medications   Medication Sig Dispense Refill    butalbital-APAP-caffeine -40 MG CAPS per capsule       lactulose (CHRONULAC) 10 GM/15ML solution Take by mouth      letrozole (FEMARA) 2.5 MG tablet Take by mouth      meclizine (ANTIVERT) 25 MG tablet Take by mouth 3 times daily      metoprolol succinate (TOPROL XL) 200 MG extended release tablet Take by mouth      nitrofurantoin, macrocrystal-monohydrate, (MACROBID) 100 MG capsule       phenazopyridine (PYRIDIUM) 100 MG tablet       polyethylene glycol (GLYCOLAX) 17 GM/SCOOP powder Take by mouth      raNITIdine (ZANTAC) 300 MG capsule Take by mouth      traMADol (ULTRAM) 50 MG tablet       triamcinolone (KENALOG) 0.1 % cream       albuterol sulfate HFA (PROVENTIL;VENTOLIN;PROAIR) 108 (90 Base) MCG/ACT inhaler Inhale 1 puff into the lungs every 6 hours as needed      amLODIPine (NORVASC) 10 MG tablet Take 10 mg by mouth daily      anastrozole (ARIMIDEX) 1 MG tablet Take 1 mg by mouth daily      aspirin 81 MG EC tablet Take 81 mg by mouth daily      atorvastatin (LIPITOR) 40 MG tablet Take 40 mg by mouth daily      benazepril (LOTENSIN) 40 MG tablet Take 40 mg by mouth daily      carvedilol (COREG) 25 MG tablet Take 25 mg by mouth 2 times daily (with meals)      chlorthalidone (HYGROTON) 25 MG tablet Take 25 mg by mouth daily      vitamin D (CHOLECALCIFEROL) 25 MCG (1000 UT) TABS tablet Take 2,000 Units by mouth daily      dexamethasone (DECADRON) 4 MG tablet Take 4 mg by mouth in the morning and 4 mg in the evening. diazePAM (VALIUM) 5 MG tablet Take 5 mg by mouth as needed. diclofenac sodium (VOLTAREN) 1 % GEL Apply 2 g topically 4 times daily      eszopiclone (LUNESTA) 3 MG TABS Take 3 mg by mouth. folic acid (FOLVITE) 1 MG tablet Take 1 mg by mouth daily      ibuprofen (ADVIL;MOTRIN) 800 MG tablet Take 800 mg by mouth every 6 hours as needed      interferon beta-1a (AVONEX) 30 MCG/0.5ML injection Inject 30 mcg into the muscle every 7 days      lidocaine (LIDODERM) 5 % Apply patch to the bilateral knees for 12 hours a day and remove for 12 hours a day. melatonin 3 MG TABS tablet Take 3 mg by mouth      meloxicam (MOBIC) 7.5 MG tablet Take 7.5 mg by mouth daily as needed      metFORMIN (GLUCOPHAGE) 500 MG tablet Take 500 mg by mouth 2 times daily (with meals)      mometasone (ASMANEX) 220 MCG/ACT AEPB inhaler Inhale 1 puff into the lungs daily      omeprazole (PRILOSEC OTC) 20 MG tablet Take 20 mg by mouth daily      ondansetron (ZOFRAN) 8 MG tablet Take 8 mg by mouth every 8 hours as needed      potassium chloride (KLOR-CON) 10 MEQ extended release tablet Take 10 mEq by mouth daily      pregabalin (LYRICA) 100 MG capsule Take 100 mg by mouth 2 times daily.       pregabalin (LYRICA) 200 MG capsule Take 200 mg by mouth.      prochlorperazine (COMPAZINE) 5 MG tablet Take 5 mg by mouth every 6 hours as needed      promethazine (PHENERGAN) 25 MG tablet Take 25 mg by mouth every 6 hours as needed      spironolactone (ALDACTONE) 50 MG tablet Take 50 mg by mouth daily      trospium (SANCTURA) 20 MG tablet Take 20 mg by mouth 3 times daily (before meals)       No current facility-administered medications for this visit. Allergies   Allergen Reactions    Penicillin G Hives and Rash     Physical Exam       Vital Signs:      General: ??????? Well nourished and well developed female without any acute distress    Psychiatric: ?  Alert and oriented x 3 with normal mood     HEENT: ???????? Atraumatic    Respiratory:   Breathing non-labored and non dyspneic    CV: ???????????????? Peripheral pulses intact, no peripheral edema    Skin: ????????????? No rashes          Neurologic: ?? Sensation: normal and grossly intact thebilateral, lower extremity(s)    Strength: 5/5 in the bilateral, lower extremity(s) b/l HF 4/5, right KE limited by pain   Reflexes: reveals 2+ symmetric DTRs throughout patella   Gait: unsteady, antalgic           Pain with lumbar flexion and extension   Tender throughout lumbar paraspinals   Tender L>R gluteal and SI joint   Pain with FADIR b/l   Negative log roll   + stinchfield b/l    Pain with PEYMAN          Medical Decision Making:     Images: The imaging results as well as the actual images of the studies below were reviewed and visualized. Labs: The results below were reviewed.     Records reviewed from [address] Dr[address] Pao Blanton      Assessment:    -low back pain-left and right lumbar radiculopathy S1   -right knee oa   -b/l hip pain      Plan:        -Physical therapy -  continue HEP    -Medications continue current medications-Lyrica 100, 100, 200mg Counseled regarding side effects and appropriate administration of medications.     -Diagnostics/Imaging -NA   -Injections Consider b/l S1 SNRB in 4 months    -Education - The patient's diagnosis, prognosis and treatment options were discussed today.  All questions were answered.     -Coordination of Care- Reviewed prior records from [address]   F/U -in 4 months             [facility]

## 2023-03-08 NOTE — PROGRESS NOTES
Alda Cosby presents today for   Chief Complaint   Patient presents with    Back Pain    Back Problem    Pain       Is someone accompanying this pt? yes    Is the patient using any DME equipment during OV? Yes w/c    Depression Screening:  No flowsheet data found. Learning Assessment:  No flowsheet data found. Abuse Screening:  No flowsheet data found. Fall Risk  No flowsheet data found. OPIOID RISK TOOL  No flowsheet data found. Coordination of Care:  1. Have you been to the ER, urgent care clinic since your last visit? no  Hospitalized since your last visit? no    2. Have you seen or consulted any other health care providers outside of the [address] [place] [address] since your last visit? no Include any pap smears or colon screening.  no

## 2023-05-10 ENCOUNTER — OFFICE VISIT (OUTPATIENT)
Age: 63
End: 2023-05-10

## 2023-05-10 VITALS — WEIGHT: 232 LBS | BODY MASS INDEX: 46.77 KG/M2 | HEIGHT: 59 IN

## 2023-05-10 DIAGNOSIS — M17.12 UNILATERAL PRIMARY OSTEOARTHRITIS, LEFT KNEE: ICD-10-CM

## 2023-05-10 DIAGNOSIS — M17.11 UNILATERAL PRIMARY OSTEOARTHRITIS, RIGHT KNEE: Primary | ICD-10-CM

## 2023-05-10 RX ORDER — BETAMETHASONE SODIUM PHOSPHATE AND BETAMETHASONE ACETATE 3; 3 MG/ML; MG/ML
3 INJECTION, SUSPENSION INTRA-ARTICULAR; INTRALESIONAL; INTRAMUSCULAR; SOFT TISSUE ONCE
Status: COMPLETED | OUTPATIENT
Start: 2023-05-10 | End: 2023-05-10

## 2023-05-10 RX ADMIN — BETAMETHASONE SODIUM PHOSPHATE AND BETAMETHASONE ACETATE 3 MG: 3; 3 INJECTION, SUSPENSION INTRA-ARTICULAR; INTRALESIONAL; INTRAMUSCULAR; SOFT TISSUE at 09:44

## 2023-05-11 RX ORDER — CARVEDILOL 25 MG/1
TABLET ORAL
Qty: 180 TABLET | Refills: 2 | Status: SHIPPED | OUTPATIENT
Start: 2023-05-11

## 2023-05-31 ENCOUNTER — OFFICE VISIT (OUTPATIENT)
Age: 63
End: 2023-05-31
Payer: MEDICARE

## 2023-05-31 VITALS — WEIGHT: 232 LBS | HEIGHT: 59 IN | BODY MASS INDEX: 46.77 KG/M2

## 2023-05-31 DIAGNOSIS — M17.11 UNILATERAL PRIMARY OSTEOARTHRITIS, RIGHT KNEE: Primary | ICD-10-CM

## 2023-05-31 DIAGNOSIS — M17.12 UNILATERAL PRIMARY OSTEOARTHRITIS, LEFT KNEE: ICD-10-CM

## 2023-05-31 PROCEDURE — 20610 DRAIN/INJ JOINT/BURSA W/O US: CPT | Performed by: SPECIALIST

## 2023-05-31 RX ORDER — HYALURONATE SODIUM 10 MG/ML
20 SYRINGE (ML) INTRAARTICULAR ONCE
Status: COMPLETED | OUTPATIENT
Start: 2023-05-31 | End: 2023-05-31

## 2023-05-31 RX ADMIN — Medication 20 MG: at 11:09

## 2023-05-31 RX ADMIN — Medication 20 MG: at 11:08

## 2023-06-05 ENCOUNTER — TELEMEDICINE (OUTPATIENT)
Age: 63
End: 2023-06-05
Payer: MEDICARE

## 2023-06-05 DIAGNOSIS — M54.16 RADICULOPATHY, LUMBAR REGION: Primary | ICD-10-CM

## 2023-06-05 PROCEDURE — 99442 PR PHYS/QHP TELEPHONE EVALUATION 11-20 MIN: CPT | Performed by: PHYSICAL MEDICINE & REHABILITATION

## 2023-06-05 NOTE — PROGRESS NOTES
Currently: 7/10     Quality: Pain is of a Stabbing and Pulling quality. Aggravating: Pain is exacerbated by sitting, standing and lying down   Alleviating: The pain is alleviated by nothing      Prior Treatments:    Dr. Lenny Berg -ESIs- last early 2020   1/18/22- Right S1 SNRB- Dr. Garcia Ross great relief x 6 months     1/31/23 - left SNRB Dr. Garcia Ross -75% relief      - b/l knee euflexxa injections Dr. Eder Saavedra 11/2022   Physical therapy- completed Aquatic therapy 11/2022   Previous Medications:    Current Medications:lyrica 100mg, 100mg, 200mg, meloxicam, on dexamethasone 4mg q12hr, tramadol prn   Previous work-up has included:    Per records      MRI lumbar spine 4/22/2021   For the purposes of this dictation, the lowest well formed intervertebral disc space is assumed to be the L5-S1 level, and there are presumed to be five lumbar-type vertebral bodies. Straightening of normal lumbar lordosis. Vertebral body heights  are maintained. Signal intensity throughout the bone marrow is within normal limits. No suspicious osseous lesion, acute fracture line, or bone marrow edema. The conus medullaris ends at a normal level. L1-L2: Circumferential disc bulge and  tiny superimposed left subarticular protrusion without significant spinal canal stenosis or neural foraminal narrowing. L2-L3: Circumferential disc bulge and mild facet arthrosis. No significant spinal canal stenosis or neural foraminal narrowing. L3-L4: Diffuse disc bulge with mild ligamentum flavum thickening and facet arthrosis. No significant spinal canal stenosis or neural foraminal narrowing. L4-L5: Diffuse disc bulge with facet arthropathy bilaterally. No significant canal stenosis. Mild bilateral neural foraminal narrowing. L5-S1: Posterior disc bulge contacting the transiting S1 nerve roots bilaterally. Bilateral facet arthropathy. Moderate right greater than left neural foraminal narrowing.            Past Medical History:     Past

## 2023-06-07 ENCOUNTER — OFFICE VISIT (OUTPATIENT)
Age: 63
End: 2023-06-07
Payer: MEDICARE

## 2023-06-07 VITALS — HEIGHT: 59 IN | BODY MASS INDEX: 46.77 KG/M2 | WEIGHT: 232 LBS

## 2023-06-07 DIAGNOSIS — M17.11 UNILATERAL PRIMARY OSTEOARTHRITIS, RIGHT KNEE: Primary | ICD-10-CM

## 2023-06-07 DIAGNOSIS — M17.12 UNILATERAL PRIMARY OSTEOARTHRITIS, LEFT KNEE: ICD-10-CM

## 2023-06-07 PROCEDURE — 20610 DRAIN/INJ JOINT/BURSA W/O US: CPT | Performed by: SPECIALIST

## 2023-06-07 RX ORDER — HYALURONATE SODIUM 10 MG/ML
20 SYRINGE (ML) INTRAARTICULAR ONCE
Status: COMPLETED | OUTPATIENT
Start: 2023-06-07 | End: 2023-06-07

## 2023-06-07 RX ADMIN — Medication 20 MG: at 11:51

## 2023-08-04 RX ORDER — AMLODIPINE BESYLATE 10 MG/1
TABLET ORAL
Qty: 90 TABLET | Refills: 3 | Status: SHIPPED | OUTPATIENT
Start: 2023-08-04

## 2023-08-31 RX ORDER — BENAZEPRIL HYDROCHLORIDE 40 MG/1
TABLET, FILM COATED ORAL
Qty: 90 TABLET | Refills: 3 | Status: SHIPPED | OUTPATIENT
Start: 2023-08-31

## 2023-11-20 RX ORDER — SALICYLIC ACID 40 %
81 ADHESIVE PATCH, MEDICATED TOPICAL DAILY
Qty: 90 TABLET | Refills: 3 | Status: SHIPPED | OUTPATIENT
Start: 2023-11-20

## 2023-12-05 ENCOUNTER — APPOINTMENT (OUTPATIENT)
Facility: HOSPITAL | Age: 63
End: 2023-12-05
Payer: MEDICARE

## 2023-12-05 ENCOUNTER — HOSPITAL ENCOUNTER (EMERGENCY)
Facility: HOSPITAL | Age: 63
Discharge: HOME | End: 2023-12-05
Attending: EMERGENCY MEDICINE
Payer: MEDICARE

## 2023-12-05 VITALS
DIASTOLIC BLOOD PRESSURE: 75 MMHG | WEIGHT: 230 LBS | BODY MASS INDEX: 46.37 KG/M2 | SYSTOLIC BLOOD PRESSURE: 147 MMHG | HEART RATE: 84 BPM | OXYGEN SATURATION: 94 % | TEMPERATURE: 98 F | HEIGHT: 59 IN | RESPIRATION RATE: 17 BRPM

## 2023-12-05 DIAGNOSIS — M25.512 LEFT ANTERIOR SHOULDER PAIN: Primary | ICD-10-CM

## 2023-12-05 DIAGNOSIS — M54.2 CERVICAL PAIN: ICD-10-CM

## 2023-12-05 PROCEDURE — 73030 X-RAY EXAM OF SHOULDER: CPT

## 2023-12-05 PROCEDURE — 6360000002 HC RX W HCPCS

## 2023-12-05 PROCEDURE — 6370000000 HC RX 637 (ALT 250 FOR IP)

## 2023-12-05 PROCEDURE — 99284 EMERGENCY DEPT VISIT MOD MDM: CPT

## 2023-12-05 PROCEDURE — 72040 X-RAY EXAM NECK SPINE 2-3 VW: CPT

## 2023-12-05 PROCEDURE — 93005 ELECTROCARDIOGRAM TRACING: CPT | Performed by: EMERGENCY MEDICINE

## 2023-12-05 PROCEDURE — 96374 THER/PROPH/DIAG INJ IV PUSH: CPT

## 2023-12-05 RX ORDER — MORPHINE SULFATE 2 MG/ML
2 INJECTION, SOLUTION INTRAMUSCULAR; INTRAVENOUS
Status: COMPLETED | OUTPATIENT
Start: 2023-12-05 | End: 2023-12-05

## 2023-12-05 RX ORDER — LIDOCAINE 50 MG/G
OINTMENT TOPICAL
Qty: 50 G | Refills: 0 | Status: SHIPPED | OUTPATIENT
Start: 2023-12-05

## 2023-12-05 RX ORDER — TRAMADOL HYDROCHLORIDE 50 MG/1
50 TABLET ORAL EVERY 6 HOURS PRN
Qty: 10 TABLET | Refills: 0 | Status: SHIPPED | OUTPATIENT
Start: 2023-12-05 | End: 2023-12-08

## 2023-12-05 RX ORDER — ACETAMINOPHEN 325 MG/1
650 TABLET ORAL
Status: COMPLETED | OUTPATIENT
Start: 2023-12-05 | End: 2023-12-05

## 2023-12-05 RX ORDER — METHOCARBAMOL 750 MG/1
750 TABLET, FILM COATED ORAL 2 TIMES DAILY
Qty: 14 TABLET | Refills: 0 | Status: SHIPPED | OUTPATIENT
Start: 2023-12-05 | End: 2023-12-12

## 2023-12-05 RX ADMIN — ACETAMINOPHEN 325MG 650 MG: 325 TABLET ORAL at 17:14

## 2023-12-05 RX ADMIN — MORPHINE SULFATE 2 MG: 2 INJECTION, SOLUTION INTRAMUSCULAR; INTRAVENOUS at 13:24

## 2023-12-05 ASSESSMENT — PAIN - FUNCTIONAL ASSESSMENT: PAIN_FUNCTIONAL_ASSESSMENT: 0-10

## 2023-12-05 ASSESSMENT — PAIN SCALES - GENERAL
PAINLEVEL_OUTOF10: 10

## 2023-12-05 NOTE — ED PROVIDER NOTES
the SEP-1 core measure? No Exclusion criteria - the patient is NOT to be included for SEP-1 Core Measure due to: Infection is not suspected      Diagnosis     Clinical Impression:   1. Left anterior shoulder pain    2. Cervical pain        Disposition: Discharge to home     Angela Gongora, [address] [facility]  [address]  [phone]    Schedule an appointment as soon as possible for a visit          Disclaimer: Sections of this note are dictated using utilizing voice recognition software. Minor typographical errors may be present. If questions arise, please do not hesitate to contact me or call our department.          Ilene Garces MD  Resident  12/06/23 [id number]

## 2023-12-05 NOTE — ED NOTES
Discharge instructions reviewed with patient. Patient verbalized understanding. Patient advised to follow up as directed on discharge instructions. Patient denies questions, needs or concerns at this time. Patient verbalized understanding. No s/sx of distress noted.        Kaitlin Watkins RN  12/05/23 [id number]

## 2023-12-05 NOTE — ED TRIAGE NOTES
Pt brought in via EMS c/o atraumatic L shoulder pain x < 1 week. Pt reports L side mastectomy in December. Pt reports pain has gotten progressively worse. Seen at PCP  yesterday and was able to move arm, pt endorses very limited ROM since. Pt states \"I did not hurt myself or fall so my PCP did not want to order an xray.  Cardiac hx, normal sinus in route to ED

## 2023-12-06 LAB
EKG ATRIAL RATE: 81 BPM
EKG DIAGNOSIS: NORMAL
EKG P AXIS: 36 DEGREES
EKG P-R INTERVAL: 166 MS
EKG Q-T INTERVAL: 358 MS
EKG QRS DURATION: 80 MS
EKG QTC CALCULATION (BAZETT): 415 MS
EKG R AXIS: 5 DEGREES
EKG T AXIS: 38 DEGREES
EKG VENTRICULAR RATE: 81 BPM

## 2023-12-06 PROCEDURE — 93010 ELECTROCARDIOGRAM REPORT: CPT | Performed by: INTERNAL MEDICINE

## 2023-12-06 ASSESSMENT — ENCOUNTER SYMPTOMS
SHORTNESS OF BREATH: 0
ABDOMINAL PAIN: 0

## 2024-01-11 NOTE — PROGRESS NOTES
Patient: Cassidy Deleon                MRN: [id number]       SSN: [id number]  YOB: 1960        AGE: 63 y.o.        SEX: female      PCP: Gianna Guerin DO  01/17/24    Chief Complaint   Patient presents with    Knee Pain     bilateral     HISTORY:  Cassidy Deleon is a 63 y.o. female who is seen for increased bilateral knee pain (R>L). Patient successfully completed a bilateral Euflexxa series on 6/14/23 but her pains have returned. She states that the previous visco supplementation series helped for a few months but she has noticed returning pain. She has been experiencing bilateral knee pain for the past  several years. She does not recall any injury. She feels pain with standing, walking.  Her ambulation is quite limited due to her severe knee arthritis. She experiences startup pain after sitting. She rates her right knee pain 10/10 and left knee pain 5/10.      She has a h/o breast cancer and is s/p mastectomy.     Occupation, etc: Ms. Deleon is retired on Social [employer]. She was a  for [employer] in [place]. She is not very active.  She is a minimal household ambulator. She lives in [place] with her  and 15 year old grandson over whom she  has guardianship. She has one son.  Ms. Deleon weighs 233 lbs and is 4'11\" tall. She is a breast cancer survivor and no longer on chemotherapy or radiation.    Wt Readings from Last 3 Encounters:   01/17/24 104.3 kg (230 lb)   12/05/23 104.3 kg (230 lb)   06/15/23 110.2 kg (243 lb)      Body mass index is 46.45 kg/m².    There is no problem list on file for this patient.      Social History     Tobacco Use    Smoking status: Never    Smokeless tobacco: Never   Substance Use Topics    Alcohol use: Never    Drug use: Never        Allergies   Allergen Reactions    Penicillin G Hives and Rash        Current Outpatient Medications   Medication Sig    lidocaine (XYLOCAINE) 5 % ointment Apply topically to left

## 2024-01-17 ENCOUNTER — OFFICE VISIT (OUTPATIENT)
Age: 64
End: 2024-01-17
Payer: MEDICARE

## 2024-01-17 VITALS — BODY MASS INDEX: 46.37 KG/M2 | HEIGHT: 59 IN | WEIGHT: 230 LBS | TEMPERATURE: 97.7 F

## 2024-01-17 DIAGNOSIS — M25.561 CHRONIC PAIN OF RIGHT KNEE: ICD-10-CM

## 2024-01-17 DIAGNOSIS — G89.29 CHRONIC PAIN OF RIGHT KNEE: ICD-10-CM

## 2024-01-17 DIAGNOSIS — M17.12 UNILATERAL PRIMARY OSTEOARTHRITIS, LEFT KNEE: ICD-10-CM

## 2024-01-17 DIAGNOSIS — G89.29 CHRONIC PAIN OF LEFT KNEE: ICD-10-CM

## 2024-01-17 DIAGNOSIS — M17.11 UNILATERAL PRIMARY OSTEOARTHRITIS, RIGHT KNEE: Primary | ICD-10-CM

## 2024-01-17 DIAGNOSIS — M25.562 CHRONIC PAIN OF LEFT KNEE: ICD-10-CM

## 2024-01-17 PROCEDURE — 99213 OFFICE O/P EST LOW 20 MIN: CPT | Performed by: SPECIALIST

## 2024-01-17 PROCEDURE — 3017F COLORECTAL CA SCREEN DOC REV: CPT | Performed by: SPECIALIST

## 2024-01-17 PROCEDURE — G8484 FLU IMMUNIZE NO ADMIN: HCPCS | Performed by: SPECIALIST

## 2024-01-17 PROCEDURE — 1036F TOBACCO NON-USER: CPT | Performed by: SPECIALIST

## 2024-01-17 PROCEDURE — G8427 DOCREV CUR MEDS BY ELIG CLIN: HCPCS | Performed by: SPECIALIST

## 2024-01-17 PROCEDURE — G8417 CALC BMI ABV UP PARAM F/U: HCPCS | Performed by: SPECIALIST

## 2024-01-17 PROCEDURE — 20610 DRAIN/INJ JOINT/BURSA W/O US: CPT | Performed by: SPECIALIST

## 2024-01-17 RX ORDER — BETAMETHASONE SODIUM PHOSPHATE AND BETAMETHASONE ACETATE 3; 3 MG/ML; MG/ML
3 INJECTION, SUSPENSION INTRA-ARTICULAR; INTRALESIONAL; INTRAMUSCULAR; SOFT TISSUE ONCE
Status: COMPLETED | OUTPATIENT
Start: 2024-01-17 | End: 2024-01-17

## 2024-01-17 RX ORDER — BUPIVACAINE HYDROCHLORIDE 5 MG/ML
4 INJECTION, SOLUTION PERINEURAL ONCE
Status: COMPLETED | OUTPATIENT
Start: 2024-01-17 | End: 2024-01-17

## 2024-01-17 RX ADMIN — BUPIVACAINE HYDROCHLORIDE 20 MG: 5 INJECTION, SOLUTION PERINEURAL at 09:08

## 2024-01-17 RX ADMIN — BETAMETHASONE SODIUM PHOSPHATE AND BETAMETHASONE ACETATE 3 MG: 3; 3 INJECTION, SUSPENSION INTRA-ARTICULAR; INTRALESIONAL; INTRAMUSCULAR; SOFT TISSUE at 09:05

## 2024-01-17 RX ADMIN — BUPIVACAINE HYDROCHLORIDE 20 MG: 5 INJECTION, SOLUTION PERINEURAL at 09:09

## 2024-01-17 RX ADMIN — BETAMETHASONE SODIUM PHOSPHATE AND BETAMETHASONE ACETATE 3 MG: 3; 3 INJECTION, SUSPENSION INTRA-ARTICULAR; INTRALESIONAL; INTRAMUSCULAR; SOFT TISSUE at 09:07

## 2024-03-11 RX ORDER — CARVEDILOL 25 MG/1
TABLET ORAL
Qty: 180 TABLET | Refills: 2 | Status: SHIPPED | OUTPATIENT
Start: 2024-03-11

## 2024-06-27 RX ORDER — CHLORTHALIDONE 25 MG/1
25 TABLET ORAL DAILY
Qty: 90 TABLET | Refills: 3 | Status: SHIPPED | OUTPATIENT
Start: 2024-06-27

## 2024-07-24 ENCOUNTER — OFFICE VISIT (OUTPATIENT)
Age: 64
End: 2024-07-24

## 2024-07-24 VITALS
BODY MASS INDEX: 48.38 KG/M2 | OXYGEN SATURATION: 95 % | DIASTOLIC BLOOD PRESSURE: 62 MMHG | HEIGHT: 59 IN | SYSTOLIC BLOOD PRESSURE: 99 MMHG | HEART RATE: 71 BPM | WEIGHT: 240 LBS

## 2024-07-24 DIAGNOSIS — R06.02 SHORTNESS OF BREATH: Primary | ICD-10-CM

## 2024-07-24 NOTE — PROGRESS NOTES
Cassidy Deleon presents today for   Chief Complaint   Patient presents with    Follow-up       Cassidy Deleon preferred language for health care discussion is english/other.    Is someone accompanying this pt? no    Is the patient using any DME equipment during OV? no    Depression Screening:  Depression: Not at risk (7/24/2024)    PHQ-2     PHQ-2 Score: 0        Learning Assessment:  Who is the primary learner? Patient    What is the preferred language for health care of the primary learner? ENGLISH    How does the primary learner prefer to learn new concepts? DEMONSTRATION    Answered By patient    Relationship to Learner SELF           Pt currently taking Anticoagulant therapy? aspirin    Pt currently taking Antiplatelet therapy ? no      Coordination of Care:  1. Have you been to the ER, urgent care clinic since your last visit? Hospitalized since your last visit? no    2. Have you seen or consulted any other health care providers outside of the [facility] since your last visit? Include any pap smears or colon screening. no

## 2024-07-24 NOTE — PATIENT INSTRUCTIONS
Echo  PATIENT PREPS:   -Wear easy to remove shirt to your appointment for easier accessibility.    **call office 3-5 days after testing is completed for results** Please ensure testing is completed prior to scheduled follow up appointment. If it is not completed your appointment may be rescheduled**

## 2024-07-24 NOTE — PROGRESS NOTES
Cassidy Deleon    Chief Complaint   Patient presents with    Follow-up     6 month follow up        HPI    Cassidy Deloen is a 61 y.o. extremely pleasant patient with history of CAD and myocardial infarctions x2 in the past approximately in 2000 and 2009 who presented for a left heart cath in the setting of ongoing chest and shoulder pain back in September of 2020 ([facility]).  I personally obtained and reviewed records his cath had no obstructive coronary disease there was commented mild diffuse disease in the RCA up to 25% in the midportion.    She was diagnosed in August 2020 of breast cancer but it does not sound like she underwent mastectomy until after the above cath mention.  I do not have all the details at the time of my encounter but she says she has completed chemotherapy still following with her oncologist down there and was able to see she had a normal ejection fraction back in 2019.    She has gained weight and she has followed with a cardiologist she says since the early 2000.  It is unclear what happened in the early 2000's but she had been maintained on dual antiplatelet therapy for years so finally after her most recent cath last year Plavix was stopped and she was started on high intensity statin.      She has no new complaints, can feel CP at rest, palps very sporadic at times.       Past Medical History:   Diagnosis Date    Anemia     Arthritis     Asthma     Well controlled    Autoimmune disease ([facility])     Back pain     Breast cancer ([facility]) 08/2020    left     Cervical cancer ([facility]) 11/09/2013    Diabetes ([facility])     NIDDM    GERD (gastroesophageal reflux disease)     Heart failure ([facility])     h/o CVA right sided weakness per cardiac note 11/2021 cc    High cholesterol     Hypertension     Lumbar herniated disc     L4/L5, S1/S2    MI (mitral incompetence) 2002    MI (myocardial infarction) ([facility]) 2000, 2009    MS (multiple sclerosis) ([facility])     Right knee pain     Sleep apnea     CPAP

## 2024-08-05 RX ORDER — SPIRONOLACTONE 50 MG/1
50 TABLET, FILM COATED ORAL DAILY
Qty: 90 TABLET | Refills: 3 | Status: SHIPPED | OUTPATIENT
Start: 2024-08-05

## 2024-08-08 RX ORDER — BENAZEPRIL HYDROCHLORIDE 40 MG/1
TABLET ORAL
Qty: 90 TABLET | Refills: 3 | Status: SHIPPED | OUTPATIENT
Start: 2024-08-08

## 2024-08-28 RX ORDER — AMLODIPINE BESYLATE 10 MG/1
TABLET ORAL
Qty: 90 TABLET | Refills: 3 | Status: SHIPPED | OUTPATIENT
Start: 2024-08-28

## 2024-10-03 RX ORDER — ASPIRIN 81 MG/1
81 TABLET, COATED ORAL DAILY
Qty: 90 TABLET | Refills: 3 | Status: SHIPPED | OUTPATIENT
Start: 2024-10-03

## 2024-10-03 RX ORDER — CARVEDILOL 25 MG/1
25 TABLET ORAL 2 TIMES DAILY WITH MEALS
Qty: 180 TABLET | Refills: 3 | Status: SHIPPED | OUTPATIENT
Start: 2024-10-03

## 2025-06-23 RX ORDER — CHLORTHALIDONE 25 MG/1
25 TABLET ORAL DAILY
Qty: 90 TABLET | Refills: 3 | Status: SHIPPED | OUTPATIENT
Start: 2025-06-23

## 2025-07-24 ENCOUNTER — OFFICE VISIT (OUTPATIENT)
Age: 65
End: 2025-07-24
Payer: MEDICARE

## 2025-07-24 VITALS
BODY MASS INDEX: 50 KG/M2 | SYSTOLIC BLOOD PRESSURE: 132 MMHG | OXYGEN SATURATION: 98 % | HEART RATE: 65 BPM | WEIGHT: 248 LBS | HEIGHT: 59 IN | DIASTOLIC BLOOD PRESSURE: 60 MMHG

## 2025-07-24 DIAGNOSIS — R06.02 SHORTNESS OF BREATH: Primary | ICD-10-CM

## 2025-07-24 DIAGNOSIS — I50.33 ACUTE ON CHRONIC DIASTOLIC CONGESTIVE HEART FAILURE (HCC): ICD-10-CM

## 2025-07-24 PROCEDURE — 99215 OFFICE O/P EST HI 40 MIN: CPT | Performed by: INTERNAL MEDICINE

## 2025-07-24 PROCEDURE — G8400 PT W/DXA NO RESULTS DOC: HCPCS | Performed by: INTERNAL MEDICINE

## 2025-07-24 PROCEDURE — G8428 CUR MEDS NOT DOCUMENT: HCPCS | Performed by: INTERNAL MEDICINE

## 2025-07-24 PROCEDURE — 93000 ELECTROCARDIOGRAM COMPLETE: CPT | Performed by: INTERNAL MEDICINE

## 2025-07-24 PROCEDURE — 1090F PRES/ABSN URINE INCON ASSESS: CPT | Performed by: INTERNAL MEDICINE

## 2025-07-24 PROCEDURE — 1036F TOBACCO NON-USER: CPT | Performed by: INTERNAL MEDICINE

## 2025-07-24 PROCEDURE — G8417 CALC BMI ABV UP PARAM F/U: HCPCS | Performed by: INTERNAL MEDICINE

## 2025-07-24 PROCEDURE — 1123F ACP DISCUSS/DSCN MKR DOCD: CPT | Performed by: INTERNAL MEDICINE

## 2025-07-24 PROCEDURE — 3017F COLORECTAL CA SCREEN DOC REV: CPT | Performed by: INTERNAL MEDICINE

## 2025-07-24 RX ORDER — FUROSEMIDE 20 MG/1
TABLET ORAL
Qty: 30 TABLET | Refills: 5 | Status: SHIPPED | OUTPATIENT
Start: 2025-07-24

## 2025-07-24 ASSESSMENT — PATIENT HEALTH QUESTIONNAIRE - PHQ9
SUM OF ALL RESPONSES TO PHQ QUESTIONS 1-9: 0
2. FEELING DOWN, DEPRESSED OR HOPELESS: NOT AT ALL
SUM OF ALL RESPONSES TO PHQ QUESTIONS 1-9: 0
1. LITTLE INTEREST OR PLEASURE IN DOING THINGS: NOT AT ALL
SUM OF ALL RESPONSES TO PHQ QUESTIONS 1-9: 0
SUM OF ALL RESPONSES TO PHQ QUESTIONS 1-9: 0

## 2025-07-24 NOTE — PROGRESS NOTES
Cassidy Deleon presents today for   Chief Complaint   Patient presents with    Follow-up     1 year       Cassidy Deleon preferred language for health care discussion is english/other.    Is someone accompanying this pt? yes    Is the patient using any DME equipment during OV? wheelchair    Depression Screening:  Depression: Not at risk (7/24/2025)    PHQ-2     PHQ-2 Score: 0        Learning Assessment:  Who is the primary learner? Patient    What is the preferred language for health care of the primary learner? ENGLISH    How does the primary learner prefer to learn new concepts? DEMONSTRATION    Answered By patient    Relationship to Learner SELF           Pt currently taking Anticoagulant therapy? no    Pt currently taking Antiplatelet therapy ? Aspirin 81 mg daily      Coordination of Care:  1. Have you been to the ER, urgent care clinic since your last visit? Hospitalized since your last visit? no    2. Have you seen or consulted any other health care providers outside of the [facility] since your last visit? Include any pap smears or colon screening. no

## 2025-07-24 NOTE — PROGRESS NOTES
Cassidy Deleon    Chief Complaint   Patient presents with    Follow-up     6 month follow up        HPI    Cassidy Deleon is a 64 y.o. extremely pleasant patient with history of CAD and myocardial infarctions x2 in the past approximately in 2000 and 2009 who presented for a left heart cath in the setting of ongoing chest and shoulder pain back in September of 2020 (Formerly [facility]).  I personally obtained and reviewed records his cath had no obstructive coronary disease there was commented mild diffuse disease in the RCA up to 25% in the midportion.    She was diagnosed in August 2020 of breast cancer but it does not sound like she underwent mastectomy until after the above cath mention.  I do not have all the details at the time of my encounter but she says she has completed chemotherapy still following with her oncologist down there and was able to see she had a normal ejection fraction back in 2019.    She has gained weight and she has followed with a cardiologist she says since the early 2000.  It is unclear what happened in the early 2000's but she had been maintained on dual antiplatelet therapy for years so finally after her most recent cath last year Plavix was stopped and she was started on high intensity statin.      She has no new complaints, can feel CP at rest, palps very sporadic at times.       Past Medical History:   Diagnosis Date    Anemia     Arthritis     Asthma     Well controlled    Autoimmune disease ([facility])     Back pain     Breast cancer ([facility]) 08/2020    left     Cervical cancer ([facility]) 11/09/2013    Diabetes ([facility])     NIDDM    GERD (gastroesophageal reflux disease)     Heart failure ([facility])     h/o CVA right sided weakness per cardiac note 11/2021 cc    High cholesterol     Hypertension     Lumbar herniated disc     L4/L5, S1/S2    MI (mitral incompetence) 2002    MI (myocardial infarction) ([facility]) 2000, 2009    MS (multiple sclerosis) ([facility])     Right knee pain     Sleep apnea     CPAP

## 2025-07-24 NOTE — PATIENT INSTRUCTIONS
Take Lasix 20 mg for 3 days once daily. Then take lasix once daily as needed for ankle swelling. If swelling does not improve with lasix, stop lasix.

## 2025-08-08 ENCOUNTER — OFFICE VISIT (OUTPATIENT)
Age: 65
End: 2025-08-08

## 2025-08-08 DIAGNOSIS — M79.661 RIGHT CALF PAIN: ICD-10-CM

## 2025-08-08 DIAGNOSIS — M17.12 UNILATERAL PRIMARY OSTEOARTHRITIS, LEFT KNEE: ICD-10-CM

## 2025-08-08 DIAGNOSIS — M17.11 UNILATERAL PRIMARY OSTEOARTHRITIS, RIGHT KNEE: Primary | ICD-10-CM

## 2025-08-08 PROBLEM — I25.10 CORONARY ARTERY DISEASE INVOLVING NATIVE CORONARY ARTERY OF NATIVE HEART WITHOUT ANGINA PECTORIS: Status: ACTIVE | Noted: 2019-10-30

## 2025-08-08 PROBLEM — I1A.0 RESISTANT HYPERTENSION: Status: ACTIVE | Noted: 2019-10-30

## 2025-08-08 RX ORDER — TRIAMCINOLONE ACETONIDE 40 MG/ML
160 INJECTION, SUSPENSION INTRA-ARTICULAR; INTRAMUSCULAR ONCE
Status: COMPLETED | OUTPATIENT
Start: 2025-08-08 | End: 2025-08-08

## 2025-08-08 RX ADMIN — TRIAMCINOLONE ACETONIDE 160 MG: 40 INJECTION, SUSPENSION INTRA-ARTICULAR; INTRAMUSCULAR at 16:12

## 2025-08-12 ENCOUNTER — HOSPITAL ENCOUNTER (OUTPATIENT)
Facility: HOSPITAL | Age: 65
Discharge: HOME | End: 2025-08-14
Payer: MEDICARE

## 2025-08-12 DIAGNOSIS — M79.661 RIGHT CALF PAIN: ICD-10-CM

## 2025-08-12 PROCEDURE — 93971 EXTREMITY STUDY: CPT | Performed by: INTERNAL MEDICINE

## 2025-08-12 PROCEDURE — 93971 EXTREMITY STUDY: CPT

## (undated) DEVICE — SYRINGE MED 25GA 3ML L5/8IN SUBQ PLAS W/ DETACH NDL SFTY

## (undated) DEVICE — MEDIA CONTRAST 10ML 200MG/ML 41%

## (undated) DEVICE — SYR 20ML LL STRL LF --

## (undated) DEVICE — BANDAGE ADH W0.75XL3IN UNIV WVN FAB NAT GEN USE STRP N ADH

## (undated) DEVICE — SNARE ENDOSCP POLYP 2.4 MM 240 CM 10 MM 2.8 MM CAPTIVATOR

## (undated) DEVICE — GAUZE,SPONGE,4"X4",16PLY,STRL,LF,10/TRAY: Brand: MEDLINE

## (undated) DEVICE — MEDI-VAC NON-CONDUCTIVE SUCTION TUBING: Brand: CARDINAL HEALTH

## (undated) DEVICE — CANNULA ORIG TL CLR W FOAM CUSHIONS AND 14FT SUPL TB 3 CHN

## (undated) DEVICE — SYR 50ML SLIP TIP NSAF LF STRL --

## (undated) DEVICE — SYR 10ML LUER LOK 1/5ML GRAD --

## (undated) DEVICE — SOLUTION IRRIG 1000ML H2O STRL BLT

## (undated) DEVICE — FLUFF AND POLYMER UNDERPAD,EXTRA HEAVY: Brand: WINGS

## (undated) DEVICE — (D)NDL SPNE 22GX15CM -- DISC BY MFR W/NO SUB

## (undated) DEVICE — CATHETER SUCT TR FL TIP 14FR W/ O CTRL

## (undated) DEVICE — MEDI-VAC SUCTION HIGH CAPACITY: Brand: CARDINAL HEALTH

## (undated) DEVICE — TRAP SPEC COLL POLYP POLYSTYR --

## (undated) DEVICE — FORCEPS BX L240CM JAW DIA2.8MM L CAP W/ NDL MIC MESH TOOTH

## (undated) DEVICE — TRAY MYEL SFTY +

## (undated) DEVICE — AVANOS* SHORT BEVEL NEEDLE: Brand: AVANOS

## (undated) DEVICE — ENDOSCOPY PUMP TUBING/ CAP SET: Brand: ERBE

## (undated) DEVICE — CANNULA NSL AD TBNG L14FT STD PVC O2 CRV CONN NONFLARED NSL

## (undated) DEVICE — GOWN ISOL IMPERV UNIV, DISP, OPEN BACK, BLUE --

## (undated) DEVICE — FLEX ADVANTAGE 3000CC: Brand: FLEX ADVANTAGE